# Patient Record
Sex: MALE | Race: WHITE | NOT HISPANIC OR LATINO | Employment: OTHER | URBAN - METROPOLITAN AREA
[De-identification: names, ages, dates, MRNs, and addresses within clinical notes are randomized per-mention and may not be internally consistent; named-entity substitution may affect disease eponyms.]

---

## 2020-03-03 ENCOUNTER — APPOINTMENT (OUTPATIENT)
Dept: PREADMISSION TESTING | Facility: HOSPITAL | Age: 80
End: 2020-03-03

## 2020-03-03 ENCOUNTER — HOSPITAL ENCOUNTER (OUTPATIENT)
Dept: GENERAL RADIOLOGY | Facility: HOSPITAL | Age: 80
Discharge: HOME | End: 2020-03-03
Admitting: ORTHOPAEDIC SURGERY

## 2020-03-03 VITALS — BODY MASS INDEX: 25.47 KG/M2 | WEIGHT: 188.05 LBS | HEIGHT: 72 IN

## 2020-03-03 LAB
25(OH)D3 SERPL-MCNC: 37.1 NG/ML (ref 30–100)
ALBUMIN SERPL-MCNC: 4.6 G/DL (ref 3.5–5.2)
ALBUMIN/GLOB SERPL: 1.8 G/DL
ALP SERPL-CCNC: 86 U/L (ref 39–117)
ALT SERPL W P-5'-P-CCNC: 17 U/L (ref 1–41)
ANION GAP SERPL CALCULATED.3IONS-SCNC: 12 MMOL/L (ref 5–15)
APTT PPP: 28.1 SECONDS (ref 24–37)
AST SERPL-CCNC: 23 U/L (ref 1–40)
BASOPHILS # BLD AUTO: 0.06 10*3/MM3 (ref 0–0.2)
BASOPHILS NFR BLD AUTO: 0.7 % (ref 0–1.5)
BILIRUB SERPL-MCNC: 0.6 MG/DL (ref 0.2–1.2)
BUN BLD-MCNC: 14 MG/DL (ref 8–23)
BUN/CREAT SERPL: 20.3 (ref 7–25)
CALCIUM SPEC-SCNC: 9.5 MG/DL (ref 8.6–10.5)
CHLORIDE SERPL-SCNC: 102 MMOL/L (ref 98–107)
CO2 SERPL-SCNC: 26 MMOL/L (ref 22–29)
CREAT BLD-MCNC: 0.69 MG/DL (ref 0.76–1.27)
DEPRECATED RDW RBC AUTO: 42.5 FL (ref 37–54)
EOSINOPHIL # BLD AUTO: 0.08 10*3/MM3 (ref 0–0.4)
EOSINOPHIL NFR BLD AUTO: 0.9 % (ref 0.3–6.2)
ERYTHROCYTE [DISTWIDTH] IN BLOOD BY AUTOMATED COUNT: 12.3 % (ref 12.3–15.4)
GFR SERPL CREATININE-BSD FRML MDRD: 110 ML/MIN/1.73
GLOBULIN UR ELPH-MCNC: 2.6 GM/DL
GLUCOSE BLD-MCNC: 95 MG/DL (ref 65–99)
HBA1C MFR BLD: 5.5 % (ref 4.8–5.6)
HCT VFR BLD AUTO: 44 % (ref 37.5–51)
HGB BLD-MCNC: 14.5 G/DL (ref 13–17.7)
IMM GRANULOCYTES # BLD AUTO: 0.03 10*3/MM3 (ref 0–0.05)
IMM GRANULOCYTES NFR BLD AUTO: 0.3 % (ref 0–0.5)
INR PPP: 0.99 (ref 0.85–1.16)
LYMPHOCYTES # BLD AUTO: 1.84 10*3/MM3 (ref 0.7–3.1)
LYMPHOCYTES NFR BLD AUTO: 20 % (ref 19.6–45.3)
MCH RBC QN AUTO: 31 PG (ref 26.6–33)
MCHC RBC AUTO-ENTMCNC: 33 G/DL (ref 31.5–35.7)
MCV RBC AUTO: 94.2 FL (ref 79–97)
MONOCYTES # BLD AUTO: 0.97 10*3/MM3 (ref 0.1–0.9)
MONOCYTES NFR BLD AUTO: 10.6 % (ref 5–12)
NEUTROPHILS # BLD AUTO: 6.21 10*3/MM3 (ref 1.7–7)
NEUTROPHILS NFR BLD AUTO: 67.5 % (ref 42.7–76)
NRBC BLD AUTO-RTO: 0 /100 WBC (ref 0–0.2)
PLATELET # BLD AUTO: 268 10*3/MM3 (ref 140–450)
PMV BLD AUTO: 9.9 FL (ref 6–12)
POTASSIUM BLD-SCNC: 3.9 MMOL/L (ref 3.5–5.2)
PROT SERPL-MCNC: 7.2 G/DL (ref 6–8.5)
PROTHROMBIN TIME: 12.8 SECONDS (ref 11.5–14)
RBC # BLD AUTO: 4.67 10*6/MM3 (ref 4.14–5.8)
SODIUM BLD-SCNC: 140 MMOL/L (ref 136–145)
WBC NRBC COR # BLD: 9.19 10*3/MM3 (ref 3.4–10.8)

## 2020-03-03 PROCEDURE — 87081 CULTURE SCREEN ONLY: CPT | Performed by: ORTHOPAEDIC SURGERY

## 2020-03-03 PROCEDURE — 80053 COMPREHEN METABOLIC PANEL: CPT | Performed by: ORTHOPAEDIC SURGERY

## 2020-03-03 PROCEDURE — 85730 THROMBOPLASTIN TIME PARTIAL: CPT | Performed by: ORTHOPAEDIC SURGERY

## 2020-03-03 PROCEDURE — 85025 COMPLETE CBC W/AUTO DIFF WBC: CPT | Performed by: ORTHOPAEDIC SURGERY

## 2020-03-03 PROCEDURE — 36415 COLL VENOUS BLD VENIPUNCTURE: CPT

## 2020-03-03 PROCEDURE — 82306 VITAMIN D 25 HYDROXY: CPT | Performed by: ORTHOPAEDIC SURGERY

## 2020-03-03 PROCEDURE — 71046 X-RAY EXAM CHEST 2 VIEWS: CPT

## 2020-03-03 PROCEDURE — 85610 PROTHROMBIN TIME: CPT | Performed by: ORTHOPAEDIC SURGERY

## 2020-03-03 PROCEDURE — G0480 DRUG TEST DEF 1-7 CLASSES: HCPCS | Performed by: ORTHOPAEDIC SURGERY

## 2020-03-03 PROCEDURE — 83036 HEMOGLOBIN GLYCOSYLATED A1C: CPT | Performed by: ORTHOPAEDIC SURGERY

## 2020-03-03 RX ORDER — CLOPIDOGREL BISULFATE 75 MG/1
75 TABLET ORAL DAILY
Status: ON HOLD | COMMUNITY
End: 2020-03-12 | Stop reason: SDUPTHER

## 2020-03-03 RX ORDER — METOPROLOL SUCCINATE 100 MG/1
100 TABLET, EXTENDED RELEASE ORAL DAILY
COMMUNITY

## 2020-03-03 RX ORDER — LISINOPRIL 40 MG/1
40 TABLET ORAL DAILY
COMMUNITY

## 2020-03-03 RX ORDER — ROSUVASTATIN CALCIUM 10 MG/1
10 TABLET, COATED ORAL DAILY
COMMUNITY

## 2020-03-03 RX ORDER — ASPIRIN 81 MG/1
81 TABLET ORAL DAILY
COMMUNITY

## 2020-03-03 RX ORDER — DILTIAZEM HYDROCHLORIDE 180 MG/1
180 CAPSULE, COATED, EXTENDED RELEASE ORAL DAILY
COMMUNITY

## 2020-03-03 RX ORDER — LEVOTHYROXINE SODIUM 0.07 MG/1
75 TABLET ORAL DAILY
COMMUNITY

## 2020-03-03 ASSESSMENT — HOOS JR
HOOS JR SCORE: 55.985
HOOS JR SCORE: 11

## 2020-03-03 NOTE — PAT
Patient to apply Chlorhexadine wipes  to surgical area (as instructed) the night before procedure and the AM of procedure. Wipes provided.  Patient instructed to drink 20 ounces (or until full) of Gatorade and it needs to be completed 1 hour before given arrival time for procedure (NO RED Gatorade)    Patient verbalized understanding.  Patient passed memory screen with no issues-documents on chart.  Patient and spouse attended joint replacement class on 3-3-20.   EKG, last PPM interrogation notes,  and clearance on chart from Jan 27 2020 from Dr. Jimenez.  Will hold chart and follow up with Dr. Jc and Dr. Jimenez re: stopping Plavix and ASA.

## 2020-03-05 LAB — MRSA SPEC QL CULT: NORMAL

## 2020-03-08 LAB
COTININE UR-MCNC: NORMAL NG/ML
NICOTINE SERPL-MCNC: NORMAL NG/ML

## 2020-03-10 ENCOUNTER — ANESTHESIA EVENT (OUTPATIENT)
Dept: PERIOP | Facility: HOSPITAL | Age: 80
End: 2020-03-10

## 2020-03-10 RX ORDER — FAMOTIDINE 10 MG/ML
20 INJECTION, SOLUTION INTRAVENOUS ONCE
Status: CANCELLED | OUTPATIENT
Start: 2020-03-10 | End: 2020-03-10

## 2020-03-11 ENCOUNTER — APPOINTMENT (OUTPATIENT)
Dept: GENERAL RADIOLOGY | Facility: HOSPITAL | Age: 80
End: 2020-03-11

## 2020-03-11 ENCOUNTER — ANESTHESIA (OUTPATIENT)
Dept: PERIOP | Facility: HOSPITAL | Age: 80
End: 2020-03-11

## 2020-03-11 ENCOUNTER — HOSPITAL ENCOUNTER (INPATIENT)
Facility: HOSPITAL | Age: 80
LOS: 1 days | Discharge: HOME | End: 2020-03-12
Attending: ORTHOPAEDIC SURGERY | Admitting: ORTHOPAEDIC SURGERY

## 2020-03-11 DIAGNOSIS — Z96.642 STATUS POST TOTAL REPLACEMENT OF LEFT HIP: Primary | ICD-10-CM

## 2020-03-11 DIAGNOSIS — Z74.09 IMPAIRED FUNCTIONAL MOBILITY, BALANCE, GAIT, AND ENDURANCE: ICD-10-CM

## 2020-03-11 DIAGNOSIS — M16.12 ARTHRITIS OF LEFT HIP: ICD-10-CM

## 2020-03-11 DIAGNOSIS — Z78.9 IMPAIRED MOBILITY AND ADLS: ICD-10-CM

## 2020-03-11 DIAGNOSIS — Z74.09 IMPAIRED MOBILITY AND ADLS: ICD-10-CM

## 2020-03-11 PROBLEM — I10 HYPERTENSION: Status: ACTIVE | Noted: 2020-03-11

## 2020-03-11 PROBLEM — E03.9 HYPOTHYROID: Status: ACTIVE | Noted: 2020-03-11

## 2020-03-11 PROBLEM — E78.5 HYPERLIPIDEMIA: Status: ACTIVE | Noted: 2020-03-11

## 2020-03-11 PROCEDURE — C1776 JOINT DEVICE (IMPLANTABLE): HCPCS | Performed by: ORTHOPAEDIC SURGERY

## 2020-03-11 PROCEDURE — C1713 ANCHOR/SCREW BN/BN,TIS/BN: HCPCS | Performed by: ORTHOPAEDIC SURGERY

## 2020-03-11 PROCEDURE — 25010000003 EPINEPHRINE 30 MG/30ML SOLUTION: Performed by: ORTHOPAEDIC SURGERY

## 2020-03-11 PROCEDURE — 25010000003 CEFAZOLIN IN DEXTROSE 2-4 GM/100ML-% SOLUTION: Performed by: ORTHOPAEDIC SURGERY

## 2020-03-11 PROCEDURE — 25010000002 KETOROLAC TROMETHAMINE PER 15 MG: Performed by: ORTHOPAEDIC SURGERY

## 2020-03-11 PROCEDURE — 25010000002 ONDANSETRON PER 1 MG: Performed by: NURSE ANESTHETIST, CERTIFIED REGISTERED

## 2020-03-11 PROCEDURE — 25010000002 CLONIDINE PER 1 MG: Performed by: ORTHOPAEDIC SURGERY

## 2020-03-11 PROCEDURE — 25010000002 PROPOFOL 10 MG/ML EMULSION: Performed by: NURSE ANESTHETIST, CERTIFIED REGISTERED

## 2020-03-11 PROCEDURE — 76000 FLUOROSCOPY <1 HR PHYS/QHP: CPT

## 2020-03-11 PROCEDURE — 25010000002 DEXAMETHASONE PER 1 MG: Performed by: NURSE ANESTHETIST, CERTIFIED REGISTERED

## 2020-03-11 PROCEDURE — 0SRB0J9 REPLACEMENT OF LEFT HIP JOINT WITH SYNTHETIC SUBSTITUTE, CEMENTED, OPEN APPROACH: ICD-10-PCS | Performed by: ORTHOPAEDIC SURGERY

## 2020-03-11 PROCEDURE — 73502 X-RAY EXAM HIP UNI 2-3 VIEWS: CPT

## 2020-03-11 PROCEDURE — 97116 GAIT TRAINING THERAPY: CPT

## 2020-03-11 PROCEDURE — 97162 PT EVAL MOD COMPLEX 30 MIN: CPT

## 2020-03-11 DEVICE — IMPLANTABLE DEVICE: Type: IMPLANTABLE DEVICE | Site: HIP | Status: FUNCTIONAL

## 2020-03-11 DEVICE — SUT NONABS MAXBRAID/PE NMBR2 HC5 38IN WHT 900333: Type: IMPLANTABLE DEVICE | Site: HIP | Status: FUNCTIONAL

## 2020-03-11 DEVICE — TOTAL HIP PRIMARY: Type: IMPLANTABLE DEVICE | Site: HIP | Status: FUNCTIONAL

## 2020-03-11 DEVICE — CAP HIP TM UPCHRG: Type: IMPLANTABLE DEVICE | Site: HIP | Status: FUNCTIONAL

## 2020-03-11 DEVICE — CAP CERAM HD HIP UPCHRG: Type: IMPLANTABLE DEVICE | Site: HIP | Status: FUNCTIONAL

## 2020-03-11 DEVICE — HD FEM/HIP BIOLOX/DELTA CERAM NK 28MM STD: Type: IMPLANTABLE DEVICE | Site: HIP | Status: FUNCTIONAL

## 2020-03-11 DEVICE — CMT BONE SIMPLEX/P TMYCIN FDOS 10PK: Type: IMPLANTABLE DEVICE | Site: HIP | Status: FUNCTIONAL

## 2020-03-11 DEVICE — BONE PREPARATION KIT
Type: IMPLANTABLE DEVICE | Site: HIP | Status: FUNCTIONAL
Brand: BIOPREP

## 2020-03-11 DEVICE — LINER G7 2MOBL SZG 46MM: Type: IMPLANTABLE DEVICE | Site: HIP | Status: FUNCTIONAL

## 2020-03-11 DEVICE — SHLL ACET OSSEOTI G7 4H SZG 60MM: Type: IMPLANTABLE DEVICE | Site: HIP | Status: FUNCTIONAL

## 2020-03-11 DEVICE — SCRW ACET CORT TRILOGY S/TAP 6.5X25: Type: IMPLANTABLE DEVICE | Site: HIP | Status: FUNCTIONAL

## 2020-03-11 DEVICE — CAP HIP 2 MOBL UPCHRG: Type: IMPLANTABLE DEVICE | Site: HIP | Status: FUNCTIONAL

## 2020-03-11 RX ORDER — LEVOTHYROXINE SODIUM 0.07 MG/1
75 TABLET ORAL DAILY
Status: DISCONTINUED | OUTPATIENT
Start: 2020-03-12 | End: 2020-03-12 | Stop reason: HOSPADM

## 2020-03-11 RX ORDER — PREGABALIN 75 MG/1
75 CAPSULE ORAL ONCE
Status: COMPLETED | OUTPATIENT
Start: 2020-03-11 | End: 2020-03-11

## 2020-03-11 RX ORDER — ROSUVASTATIN CALCIUM 10 MG/1
10 TABLET, COATED ORAL DAILY
Status: DISCONTINUED | OUTPATIENT
Start: 2020-03-12 | End: 2020-03-12 | Stop reason: HOSPADM

## 2020-03-11 RX ORDER — TRAMADOL HYDROCHLORIDE 50 MG/1
50 TABLET ORAL EVERY 6 HOURS PRN
Status: DISCONTINUED | OUTPATIENT
Start: 2020-03-11 | End: 2020-03-12 | Stop reason: HOSPADM

## 2020-03-11 RX ORDER — BUPIVACAINE HYDROCHLORIDE 5 MG/ML
INJECTION, SOLUTION PERINEURAL
Status: COMPLETED | OUTPATIENT
Start: 2020-03-11 | End: 2020-03-11

## 2020-03-11 RX ORDER — MAGNESIUM HYDROXIDE 1200 MG/15ML
LIQUID ORAL AS NEEDED
Status: DISCONTINUED | OUTPATIENT
Start: 2020-03-11 | End: 2020-03-11 | Stop reason: HOSPADM

## 2020-03-11 RX ORDER — HYDROMORPHONE HYDROCHLORIDE 1 MG/ML
0.5 INJECTION, SOLUTION INTRAMUSCULAR; INTRAVENOUS; SUBCUTANEOUS
Status: DISCONTINUED | OUTPATIENT
Start: 2020-03-11 | End: 2020-03-12 | Stop reason: HOSPADM

## 2020-03-11 RX ORDER — ASPIRIN 81 MG/1
81 TABLET ORAL EVERY 12 HOURS SCHEDULED
Status: DISCONTINUED | OUTPATIENT
Start: 2020-03-12 | End: 2020-03-12 | Stop reason: HOSPADM

## 2020-03-11 RX ORDER — NALOXONE HCL 0.4 MG/ML
0.1 VIAL (ML) INJECTION
Status: DISCONTINUED | OUTPATIENT
Start: 2020-03-11 | End: 2020-03-12 | Stop reason: HOSPADM

## 2020-03-11 RX ORDER — SODIUM CHLORIDE, SODIUM LACTATE, POTASSIUM CHLORIDE, CALCIUM CHLORIDE 600; 310; 30; 20 MG/100ML; MG/100ML; MG/100ML; MG/100ML
9 INJECTION, SOLUTION INTRAVENOUS CONTINUOUS
Status: DISCONTINUED | OUTPATIENT
Start: 2020-03-11 | End: 2020-03-12 | Stop reason: HOSPADM

## 2020-03-11 RX ORDER — DILTIAZEM HYDROCHLORIDE 180 MG/1
180 CAPSULE, COATED, EXTENDED RELEASE ORAL DAILY
Status: DISCONTINUED | OUTPATIENT
Start: 2020-03-12 | End: 2020-03-12 | Stop reason: HOSPADM

## 2020-03-11 RX ORDER — ONDANSETRON 2 MG/ML
4 INJECTION INTRAMUSCULAR; INTRAVENOUS EVERY 6 HOURS PRN
Status: DISCONTINUED | OUTPATIENT
Start: 2020-03-11 | End: 2020-03-12 | Stop reason: HOSPADM

## 2020-03-11 RX ORDER — FENTANYL CITRATE 50 UG/ML
50 INJECTION, SOLUTION INTRAMUSCULAR; INTRAVENOUS
Status: DISCONTINUED | OUTPATIENT
Start: 2020-03-11 | End: 2020-03-11 | Stop reason: HOSPADM

## 2020-03-11 RX ORDER — OXYCODONE HYDROCHLORIDE 5 MG/1
10 TABLET ORAL EVERY 4 HOURS PRN
Status: DISCONTINUED | OUTPATIENT
Start: 2020-03-11 | End: 2020-03-12 | Stop reason: HOSPADM

## 2020-03-11 RX ORDER — HYDROMORPHONE HYDROCHLORIDE 1 MG/ML
0.5 INJECTION, SOLUTION INTRAMUSCULAR; INTRAVENOUS; SUBCUTANEOUS
Status: DISCONTINUED | OUTPATIENT
Start: 2020-03-11 | End: 2020-03-11 | Stop reason: HOSPADM

## 2020-03-11 RX ORDER — KETOROLAC TROMETHAMINE 15 MG/ML
15 INJECTION, SOLUTION INTRAMUSCULAR; INTRAVENOUS EVERY 6 HOURS PRN
Status: DISCONTINUED | OUTPATIENT
Start: 2020-03-11 | End: 2020-03-12 | Stop reason: HOSPADM

## 2020-03-11 RX ORDER — ONDANSETRON 2 MG/ML
INJECTION INTRAMUSCULAR; INTRAVENOUS AS NEEDED
Status: DISCONTINUED | OUTPATIENT
Start: 2020-03-11 | End: 2020-03-11 | Stop reason: SURG

## 2020-03-11 RX ORDER — L.ACID,PARA/B.BIFIDUM/S.THERM 8B CELL
1 CAPSULE ORAL DAILY
Status: DISCONTINUED | OUTPATIENT
Start: 2020-03-12 | End: 2020-03-12 | Stop reason: HOSPADM

## 2020-03-11 RX ORDER — BISACODYL 5 MG/1
10 TABLET, DELAYED RELEASE ORAL DAILY PRN
Status: DISCONTINUED | OUTPATIENT
Start: 2020-03-11 | End: 2020-03-12 | Stop reason: HOSPADM

## 2020-03-11 RX ORDER — SODIUM CHLORIDE 0.9 % (FLUSH) 0.9 %
10 SYRINGE (ML) INJECTION EVERY 12 HOURS SCHEDULED
Status: DISCONTINUED | OUTPATIENT
Start: 2020-03-11 | End: 2020-03-11 | Stop reason: HOSPADM

## 2020-03-11 RX ORDER — ONDANSETRON 2 MG/ML
4 INJECTION INTRAMUSCULAR; INTRAVENOUS ONCE AS NEEDED
Status: DISCONTINUED | OUTPATIENT
Start: 2020-03-11 | End: 2020-03-11 | Stop reason: HOSPADM

## 2020-03-11 RX ORDER — MELOXICAM 15 MG/1
15 TABLET ORAL ONCE
Status: COMPLETED | OUTPATIENT
Start: 2020-03-11 | End: 2020-03-11

## 2020-03-11 RX ORDER — ACETAMINOPHEN 160 MG
TABLET,DISINTEGRATING ORAL AS NEEDED
Status: DISCONTINUED | OUTPATIENT
Start: 2020-03-11 | End: 2020-03-11 | Stop reason: HOSPADM

## 2020-03-11 RX ORDER — METOPROLOL SUCCINATE 100 MG/1
100 TABLET, EXTENDED RELEASE ORAL DAILY
Status: DISCONTINUED | OUTPATIENT
Start: 2020-03-12 | End: 2020-03-12 | Stop reason: HOSPADM

## 2020-03-11 RX ORDER — SODIUM CHLORIDE, SODIUM LACTATE, POTASSIUM CHLORIDE, CALCIUM CHLORIDE 600; 310; 30; 20 MG/100ML; MG/100ML; MG/100ML; MG/100ML
100 INJECTION, SOLUTION INTRAVENOUS CONTINUOUS
Status: DISCONTINUED | OUTPATIENT
Start: 2020-03-11 | End: 2020-03-12 | Stop reason: HOSPADM

## 2020-03-11 RX ORDER — DEXAMETHASONE SODIUM PHOSPHATE 4 MG/ML
INJECTION, SOLUTION INTRA-ARTICULAR; INTRALESIONAL; INTRAMUSCULAR; INTRAVENOUS; SOFT TISSUE AS NEEDED
Status: DISCONTINUED | OUTPATIENT
Start: 2020-03-11 | End: 2020-03-11 | Stop reason: SURG

## 2020-03-11 RX ORDER — DOCUSATE SODIUM 100 MG/1
100 CAPSULE, LIQUID FILLED ORAL 2 TIMES DAILY PRN
Status: DISCONTINUED | OUTPATIENT
Start: 2020-03-11 | End: 2020-03-12 | Stop reason: HOSPADM

## 2020-03-11 RX ORDER — LIDOCAINE HYDROCHLORIDE 10 MG/ML
0.5 INJECTION, SOLUTION EPIDURAL; INFILTRATION; INTRACAUDAL; PERINEURAL ONCE AS NEEDED
Status: COMPLETED | OUTPATIENT
Start: 2020-03-11 | End: 2020-03-11

## 2020-03-11 RX ORDER — MELOXICAM 7.5 MG/1
15 TABLET ORAL DAILY
Status: DISCONTINUED | OUTPATIENT
Start: 2020-03-12 | End: 2020-03-12 | Stop reason: HOSPADM

## 2020-03-11 RX ORDER — BISACODYL 10 MG
10 SUPPOSITORY, RECTAL RECTAL DAILY PRN
Status: DISCONTINUED | OUTPATIENT
Start: 2020-03-11 | End: 2020-03-12 | Stop reason: HOSPADM

## 2020-03-11 RX ORDER — CEFAZOLIN SODIUM 2 G/100ML
2 INJECTION, SOLUTION INTRAVENOUS ONCE
Status: COMPLETED | OUTPATIENT
Start: 2020-03-11 | End: 2020-03-11

## 2020-03-11 RX ORDER — ACETAMINOPHEN 500 MG
1000 TABLET ORAL EVERY 8 HOURS
Status: DISCONTINUED | OUTPATIENT
Start: 2020-03-11 | End: 2020-03-12 | Stop reason: HOSPADM

## 2020-03-11 RX ORDER — CEFAZOLIN SODIUM 2 G/100ML
2 INJECTION, SOLUTION INTRAVENOUS EVERY 8 HOURS
Status: COMPLETED | OUTPATIENT
Start: 2020-03-11 | End: 2020-03-12

## 2020-03-11 RX ORDER — SODIUM CHLORIDE 0.9 % (FLUSH) 0.9 %
10 SYRINGE (ML) INJECTION AS NEEDED
Status: DISCONTINUED | OUTPATIENT
Start: 2020-03-11 | End: 2020-03-11 | Stop reason: HOSPADM

## 2020-03-11 RX ORDER — LABETALOL HYDROCHLORIDE 5 MG/ML
10 INJECTION, SOLUTION INTRAVENOUS EVERY 4 HOURS PRN
Status: DISCONTINUED | OUTPATIENT
Start: 2020-03-11 | End: 2020-03-12 | Stop reason: HOSPADM

## 2020-03-11 RX ORDER — OXYCODONE HYDROCHLORIDE 5 MG/1
5 TABLET ORAL EVERY 4 HOURS PRN
Status: DISCONTINUED | OUTPATIENT
Start: 2020-03-11 | End: 2020-03-12 | Stop reason: HOSPADM

## 2020-03-11 RX ORDER — LISINOPRIL 40 MG/1
40 TABLET ORAL DAILY
Status: DISCONTINUED | OUTPATIENT
Start: 2020-03-12 | End: 2020-03-12 | Stop reason: HOSPADM

## 2020-03-11 RX ORDER — FAMOTIDINE 20 MG/1
20 TABLET, FILM COATED ORAL ONCE
Status: COMPLETED | OUTPATIENT
Start: 2020-03-11 | End: 2020-03-11

## 2020-03-11 RX ORDER — ACETAMINOPHEN 500 MG
1000 TABLET ORAL ONCE
Status: COMPLETED | OUTPATIENT
Start: 2020-03-11 | End: 2020-03-11

## 2020-03-11 RX ORDER — AMOXICILLIN 250 MG
2 CAPSULE ORAL 2 TIMES DAILY PRN
Status: DISCONTINUED | OUTPATIENT
Start: 2020-03-11 | End: 2020-03-12 | Stop reason: HOSPADM

## 2020-03-11 RX ADMIN — KETOROLAC TROMETHAMINE 15 MG: 15 INJECTION, SOLUTION INTRAMUSCULAR; INTRAVENOUS at 16:34

## 2020-03-11 RX ADMIN — CEFAZOLIN SODIUM 2 G: 2 INJECTION, SOLUTION INTRAVENOUS at 19:56

## 2020-03-11 RX ADMIN — ONDANSETRON 4 MG: 2 INJECTION INTRAMUSCULAR; INTRAVENOUS at 11:31

## 2020-03-11 RX ADMIN — MELOXICAM 15 MG: 15 TABLET ORAL at 10:52

## 2020-03-11 RX ADMIN — LIDOCAINE HYDROCHLORIDE 0.3 ML: 10 INJECTION, SOLUTION EPIDURAL; INFILTRATION; INTRACAUDAL; PERINEURAL at 10:52

## 2020-03-11 RX ADMIN — ACETAMINOPHEN 1000 MG: 500 TABLET, FILM COATED ORAL at 18:14

## 2020-03-11 RX ADMIN — SODIUM CHLORIDE, POTASSIUM CHLORIDE, SODIUM LACTATE AND CALCIUM CHLORIDE: 600; 310; 30; 20 INJECTION, SOLUTION INTRAVENOUS at 13:22

## 2020-03-11 RX ADMIN — SODIUM CHLORIDE, POTASSIUM CHLORIDE, SODIUM LACTATE AND CALCIUM CHLORIDE 9 ML/HR: 600; 310; 30; 20 INJECTION, SOLUTION INTRAVENOUS at 10:52

## 2020-03-11 RX ADMIN — TRANEXAMIC ACID 1000 MG: 100 INJECTION, SOLUTION INTRAVENOUS at 11:29

## 2020-03-11 RX ADMIN — SODIUM CHLORIDE, POTASSIUM CHLORIDE, SODIUM LACTATE AND CALCIUM CHLORIDE: 600; 310; 30; 20 INJECTION, SOLUTION INTRAVENOUS at 11:18

## 2020-03-11 RX ADMIN — TRANEXAMIC ACID 1000 MG: 100 INJECTION, SOLUTION INTRAVENOUS at 13:09

## 2020-03-11 RX ADMIN — FAMOTIDINE 20 MG: 20 TABLET ORAL at 10:52

## 2020-03-11 RX ADMIN — DEXAMETHASONE SODIUM PHOSPHATE 4 MG: 4 INJECTION, SOLUTION INTRAMUSCULAR; INTRAVENOUS at 11:31

## 2020-03-11 RX ADMIN — ACETAMINOPHEN 1000 MG: 500 TABLET ORAL at 10:52

## 2020-03-11 RX ADMIN — BUPIVACAINE HYDROCHLORIDE 1.2 ML: 5 INJECTION, SOLUTION PERINEURAL at 11:25

## 2020-03-11 RX ADMIN — SODIUM CHLORIDE, POTASSIUM CHLORIDE, SODIUM LACTATE AND CALCIUM CHLORIDE 100 ML/HR: 600; 310; 30; 20 INJECTION, SOLUTION INTRAVENOUS at 15:37

## 2020-03-11 RX ADMIN — PROPOFOL 66 MCG/KG/MIN: 10 INJECTION, EMULSION INTRAVENOUS at 11:25

## 2020-03-11 RX ADMIN — PREGABALIN 75 MG: 75 CAPSULE ORAL at 10:52

## 2020-03-11 RX ADMIN — CEFAZOLIN SODIUM 2 G: 2 INJECTION, SOLUTION INTRAVENOUS at 11:22

## 2020-03-11 RX ADMIN — MUPIROCIN 1 APPLICATION: 20 OINTMENT TOPICAL at 10:52

## 2020-03-11 NOTE — ANESTHESIA POSTPROCEDURE EVALUATION
Patient: Benito Forman    Procedure Summary     Date:  03/11/20 Room / Location:   ANY OR 14 /  ANY OR    Anesthesia Start:  1118 Anesthesia Stop:  1349    Procedure:  TOTAL HIP ARTHROPLASTY ANTERIOR DIRECT CEMENTED LEFT (Left Hip) Diagnosis:       Arthritis of left hip      (Arthritis of left hip [[id number]])    Surgeon:  Vishnu Jc MD Provider:  Ellyn Galindo MD    Anesthesia Type:  spinal ASA Status:  3          Anesthesia Type: spinal    Vitals  No vitals data found for the desired time range.          Post Anesthesia Care and Evaluation    Patient location during evaluation: PACU  Patient participation: complete - patient participated  Level of consciousness: awake and alert  Pain score: 0  Pain management: adequate  Airway patency: patent  Anesthetic complications: No anesthetic complications  PONV Status: none  Cardiovascular status: hemodynamically stable and acceptable  Respiratory status: nonlabored ventilation, acceptable and nasal cannula  Hydration status: acceptable

## 2020-03-11 NOTE — THERAPY EVALUATION
Patient Name: Benito Forman  : 1940    MRN: [id number]                              Today's Date: 3/11/2020       Admit Date: 3/11/2020    Visit Dx: No diagnosis found.  Patient Active Problem List   Diagnosis   • Arthritis of left hip   • Status post total replacement of left hip   • Hypertension   • Hypothyroid   • Hyperlipidemia     Past Medical History:   Diagnosis Date   • Arthritis    • Disease of thyroid gland    • Elevated cholesterol    • Hearing aid worn    • Heart attack (CMS/HCC)    • Hypertension    • Irregular heart beat    • Wears dentures    • Wears glasses      Past Surgical History:   Procedure Laterality Date   • CARDIAC CATHETERIZATION     • CHOLECYSTECTOMY     • COLONOSCOPY     • CORONARY ANGIOPLASTY WITH STENT PLACEMENT     • ELBOW FUSION     • PACEMAKER IMPLANTATION     • POLYPECTOMY       General Information     Row Name 20 1551          PT Evaluation Time/Intention    Document Type  evaluation  -LR     Mode of Treatment  physical therapy;individual therapy  -LR     Row Name 20 1551          General Information    Patient Profile Reviewed?  yes  -LR     Prior Level of Function  min assist:;all household mobility;community mobility;gait;transfer;bed mobility;ADL's;using stairs;shopping;independent:;driving;dependent:;home management;cooking;cleaning no home management, limited long distances, used SPC at all times  -LR     Existing Precautions/Restrictions  fall;left;hip, anterior;other (see comments) [place]  -LR     Barriers to Rehab  previous functional deficit  -LR     Row Name 20 1551          Relationship/Environment    Lives With  spouse wife available to assist at all times upon d/c home  -LR     Row Name 20 1551          Resource/Environmental Concerns    Current Living Arrangements  home/apartment/condo  -LR     Row Name 20 1551          Home Main Entrance    Number of Stairs, Main Entrance  one  -LR     Stair Railings, Main Entrance  none  -LR      Row Name 03/11/20 1551          Stairs Within Home, Primary    Number of Stairs, Within Home, Primary  none  -LR     Row Name 03/11/20 1551          Cognitive Assessment/Intervention- PT/OT    Orientation Status (Cognition)  oriented x 4  -LR     Row Name 03/11/20 1551          Safety Issues, Functional Mobility    Safety Issues Affecting Function (Mobility)  awareness of need for assistance;insight into deficits/self awareness;positioning of assistive device;sequencing abilities;safety precautions follow-through/compliance;safety precaution awareness  -LR     Impairments Affecting Function (Mobility)  pain;strength;range of motion (ROM)  -LR       User Key  (r) = Recorded By, (t) = Taken By, (c) = Cosigned By    Initials Name Provider Type    LR Saloni Marc, PT Physical Therapist        Mobility     Row Name 03/11/20 1551          Bed Mobility Assessment/Treatment    Bed Mobility Assessment/Treatment  supine-sit  -LR     Supine-Sit [place] (Bed Mobility)  verbal cues;contact guard  -LR     Assistive Device (Bed Mobility)  head of bed elevated;bed rails  -LR     Comment (Bed Mobility)  Verbal cues to move LEs towards EOB and to push up from bed to raise trunk into sitting and to scoot hips out to get feet on floor. CGA to L LE only. Denied dizziness upon sitting up.   -LR     Row Name 03/11/20 1551          Transfer Assessment/Treatment    Comment (Transfers)  Verbal cues to push up from bed to stand and to reach back for chair to lower into sitting. Verbal cues to step L LE out before t/f for comfort. Assisted to and from bathroom. Cues to drive RW over commode to stand to void.   -LR     Row Name 03/11/20 1551          Sit-Stand Transfer    Sit-Stand [place] (Transfers)  verbal cues;contact guard;2 person assist  -LR     Assistive Device (Sit-Stand Transfers)  walker, front-wheeled  -LR     Row Name 03/11/20 1551          Gait/Stairs Assessment/Training    31551 - Gait Training Minutes   6   -LR     [place] Level (Gait)  verbal cues;contact guard;2 person assist  -LR     Assistive Device (Gait)  walker, front-wheeled  -LR     Distance in Feet (Gait)  250  -LR     Pattern (Gait)  step-through  -LR     Deviations/Abnormal Patterns (Gait)  bilateral deviations;bethany decreased;gait speed decreased;stride length decreased;left sided deviations;antalgic  -LR     Bilateral Gait Deviations  forward flexed posture;heel strike decreased  -LR     Left Sided Gait Deviations  weight shift ability decreased  -LR     [place] Level (Stairs)  verbal cues;contact guard;2 person assist  -LR     Assistive Device (Stairs)  walker, front-wheeled  -LR     Comment (Gait/Stairs)  Patient ambulated with step through gait pattern at slow pace. Verbal cues for increased L LE weight bearing/stance phase, decreased UE weight bearing, and increased step length. Improved with cues for correction. Cues to not pivot or twist during turns. Gait limited by pain and fatigue.   -LR     Row Name 03/11/20 1551          Mobility Assessment/Intervention    Extremity Weight-bearing Status  left lower extremity  -LR     Left Lower Extremity (Weight-bearing Status)  weight-bearing as tolerated (WBAT)  -LR       User Key  (r) = Recorded By, (t) = Taken By, (c) = Cosigned By    Initials Name Provider Type    LR Saloni Marc, PT Physical Therapist        Obj/Interventions     Row Name 03/11/20 1551          General ROM    GENERAL ROM COMMENTS  R LE AROM WFL; L hip AROM impaired 25%  -LR     Row Name 03/11/20 1551          MMT (Manual Muscle Testing)    General MMT Comments  R LE WFL; L hip functionally 4-/5, independent with SLR, no knee buckling with gait  -LR     Row Name 03/11/20 1551          Therapeutic Exercise    Lower Extremity (Therapeutic Exercise)  gluteal sets;quad sets, left  -LR     Lower Extremity Range of Motion (Therapeutic Exercise)  ankle dorsiflexion/plantar flexion, left  -LR     Exercise Type (Therapeutic  Exercise)  AROM (active range of motion);isotonic contraction, concentric;isometric contraction, static  -LR     Position (Therapeutic Exercise)  supine  -LR     Sets/Reps (Therapeutic Exercise)  x10 reps each  -LR     Comment (Therapeutic Exercise)  cues for technique; able to actively DF bilaterally  -LR     Row Name 03/11/20 1551          Sensory Assessment/Intervention    Sensory General Assessment  no sensation deficits identified denies numbness/tingling; light touch equal and intact  -LR       User Key  (r) = Recorded By, (t) = Taken By, (c) = Cosigned By    Initials Name Provider Type    LR Saloni Marc, PT Physical Therapist        Goals/Plan     Row Name 03/11/20 1551          Bed Mobility Goal 1 (PT)    Activity/Assistive Device (Bed Mobility Goal 1, PT)  sit to supine/supine to sit  -LR     [place] Level/Cues Needed (Bed Mobility Goal 1, PT)  conditional independence  -LR     Time Frame (Bed Mobility Goal 1, PT)  long term goal (LTG);3 days  -LR     Progress/Outcomes (Bed Mobility Goal 1, PT)  goal ongoing  -LR     Row Name 03/11/20 1551          Transfer Goal 1 (PT)    Activity/Assistive Device (Transfer Goal 1, PT)  sit-to-stand/stand-to-sit;walker, rolling  -LR     [place] Level/Cues Needed (Transfer Goal 1, PT)  conditional independence  -LR     Time Frame (Transfer Goal 1, PT)  long term goal (LTG);3 days  -LR     Progress/Outcome (Transfer Goal 1, PT)  goal ongoing  -LR     Row Name 03/11/20 1551          Gait Training Goal 1 (PT)    Activity/Assistive Device (Gait Training Goal 1, PT)  gait (walking locomotion);walker, rolling  -LR     [place] Level (Gait Training Goal 1, PT)  conditional independence  -LR     Distance (Gait Goal 1, PT)  500 feet  -LR     Time Frame (Gait Training Goal 1, PT)  long term goal (LTG);3 days  -LR     Progress/Outcome (Gait Training Goal 1, PT)  goal ongoing  -LR     Row Name 03/11/20 1551          Stairs Goal 1 (PT)    Activity/Assistive  Device (Stairs Goal 1, PT)  ascending stairs;descending stairs;step-to-step;walker, rolling  -LR     [place] Level/Cues Needed (Stairs Goal 1, PT)  contact guard assist  -LR     Number of Stairs (Stairs Goal 1, PT)  1  -LR     Time Frame (Stairs Goal 1, PT)  long term goal (LTG);3 days  -LR     Progress/Outcome (Stairs Goal 1, PT)  goal ongoing  -LR       User Key  (r) = Recorded By, (t) = Taken By, (c) = Cosigned By    Initials Name Provider Type    LR Saloni Marc, PT Physical Therapist        Clinical Impression     Row Name 03/11/20 [email or web address]          Pain Assessment    Additional Documentation  Pain Scale: Numbers Pre/Post-Treatment (Group)  -LR     Row Name 03/11/20 [facility]          Pain Scale: Numbers Pre/Post-Treatment    Pain Scale: Numbers, Pretreatment  0/10 - no pain  -LR     Pain Scale: Numbers, Post-Treatment  5/10  -LR     Pain Location - Side  Left  -LR     Pain Location - Orientation  anterior  -LR     Pain Location  hip  -LR     Pain Intervention(s)  Ambulation/increased activity;Repositioned;Medication (See MAR);Cold applied  -LR     Row Name 03/11/20 1551          Plan of Care Review    Plan of Care Reviewed With  patient;spouse;son  -LR     Progress  improving  -LR     Row Name 03/11/20 [facility]          Physical Therapy Clinical Impression    Patient/Family Goals Statement (PT Clinical Impression)  go home, decrease pain  -LR     Criteria for Skilled Interventions Met (PT Clinical Impression)  yes;treatment indicated  -LR     Rehab Potential (PT Clinical Summary)  good, to achieve stated therapy goals  -LR     Row Name 03/11/20 1551          Positioning and Restraints    Pre-Treatment Position  in bed  -LR     Post Treatment Position  chair  -LR     In Chair  notified nsg;reclined;sitting;call light within reach;encouraged to call for assist;exit alarm on;with family/caregiver;legs elevated;compression device  -LR       User Key  (r) = Recorded By, (t) = Taken By, (c) = Cosigned By     Initials Name Provider Type    LR Saloni Marc, PT Physical Therapist        Outcome Measures     Row Name 03/11/20 1551          How much help from another person do you currently need...    Turning from your back to your side while in flat bed without using bedrails?  3  -LR     Moving from lying on back to sitting on the side of a flat bed without bedrails?  3  -LR     Moving to and from a bed to a chair (including a wheelchair)?  3  -LR     Standing up from a chair using your arms (e.g., wheelchair, bedside chair)?  3  -LR     Climbing 3-5 steps with a railing?  3  -LR     To walk in hospital room?  3  -LR     AM-PAC 6 Clicks Score (PT)  18  -LR     Row Name 03/11/20 1551          Functional Assessment    Outcome Measure Options  AM-PAC 6 Clicks Basic Mobility (PT)  -LR       User Key  (r) = Recorded By, (t) = Taken By, (c) = Cosigned By    Initials Name Provider Type    Saloni Gaxiola, PT Physical Therapist        Physical Therapy Education                 Title: PT OT SLP Therapies (Done)     Topic: Physical Therapy (Done)     Point: Mobility training (Done)     Description:   Instruct learner(s) on safety and technique for assisting patient out of bed, chair or wheelchair.  Instruct in the proper use of assistive devices, such as walker, crutches, cane or brace.              Patient Friendly Description:   It's important to get you on your feet again, but we need to do so in a way that is safe for you. Falling has serious consequences, and your personal safety is the most important thing of all.        When it's time to get out of bed, one of us or a family member will sit next to you on the bed to give you support.     If your doctor or nurse tells you to use a walker, crutches, a cane, or a brace, be sure you use it every time you get out of bed, even if you think you don't need it.    Learning Progress Summary           Patient Acceptance, E,D, VU,NR by LR at 3/11/2020 1341    Comment:   Educated on anterior hip precautions, weight bearing status, correct supine to sit t/f technique, correct sit<->stand t/f technique, correct gait mechanics, and progression of POC.   Family Acceptance, E,D, VU,NR by LR at 3/11/2020 1551    Comment:  Educated on anterior hip precautions, weight bearing status, correct supine to sit t/f technique, correct sit<->stand t/f technique, correct gait mechanics, and progression of POC.   Significant Other Acceptance, E,D, VU,NR by LR at 3/11/2020 1551    Comment:  Educated on anterior hip precautions, weight bearing status, correct supine to sit t/f technique, correct sit<->stand t/f technique, correct gait mechanics, and progression of POC.                   Point: Home exercise program (Done)     Description:   Instruct learner(s) on appropriate technique for monitoring, assisting and/or progressing patient with therapeutic exercises and activities.              Learning Progress Summary           Patient Acceptance, E,D, VU,NR by LR at 3/11/2020 1551    Comment:  Educated on anterior hip precautions, weight bearing status, correct supine to sit t/f technique, correct sit<->stand t/f technique, correct gait mechanics, and progression of POC.   Family Acceptance, E,D, VU,NR by LR at 3/11/2020 1551    Comment:  Educated on anterior hip precautions, weight bearing status, correct supine to sit t/f technique, correct sit<->stand t/f technique, correct gait mechanics, and progression of POC.   Significant Other Acceptance, E,D, VU,NR by LR at 3/11/2020 1551    Comment:  Educated on anterior hip precautions, weight bearing status, correct supine to sit t/f technique, correct sit<->stand t/f technique, correct gait mechanics, and progression of POC.                   Point: Body mechanics (Done)     Description:   Instruct learner(s) on proper positioning and spine alignment for patient and/or caregiver during mobility tasks and/or exercises.              Learning Progress  Summary           Patient Acceptance, E,D, VU,NR by LR at 3/11/2020 1551    Comment:  Educated on anterior hip precautions, weight bearing status, correct supine to sit t/f technique, correct sit<->stand t/f technique, correct gait mechanics, and progression of POC.   Family Acceptance, E,D, VU,NR by LR at 3/11/2020 1551    Comment:  Educated on anterior hip precautions, weight bearing status, correct supine to sit t/f technique, correct sit<->stand t/f technique, correct gait mechanics, and progression of POC.   Significant Other Acceptance, E,D, VU,NR by LR at 3/11/2020 1551    Comment:  Educated on anterior hip precautions, weight bearing status, correct supine to sit t/f technique, correct sit<->stand t/f technique, correct gait mechanics, and progression of POC.                   Point: Precautions (Done)     Description:   Instruct learner(s) on prescribed precautions during mobility and gait tasks              Learning Progress Summary           Patient Acceptance, E,D, VU,NR by LR at 3/11/2020 1551    Comment:  Educated on anterior hip precautions, weight bearing status, correct supine to sit t/f technique, correct sit<->stand t/f technique, correct gait mechanics, and progression of POC.   Family Acceptance, E,D, VU,NR by LR at 3/11/2020 1551    Comment:  Educated on anterior hip precautions, weight bearing status, correct supine to sit t/f technique, correct sit<->stand t/f technique, correct gait mechanics, and progression of POC.   Significant Other Acceptance, E,D, VU,NR by LR at 3/11/2020 1551    Comment:  Educated on anterior hip precautions, weight bearing status, correct supine to sit t/f technique, correct sit<->stand t/f technique, correct gait mechanics, and progression of POC.                               User Key     Initials Effective Dates Name Provider Type Discipline    LR 06/19/15 -  Saloni Marc, PT Physical Therapist PT              PT Recommendation and Plan  Planned Therapy  Interventions (PT Eval): balance training, bed mobility training, gait training, home exercise program, stair training, ROM (range of motion), patient/family education, strengthening, transfer training  Outcome Summary/Treatment Plan (PT)  Anticipated Equipment Needs at Discharge (PT): front wheeled walker  Anticipated Discharge Disposition (PT): home with assist, home with home health  Plan of Care Reviewed With: patient, spouse, son  Progress: improving  Outcome Summary: Patient ambulated 250 feet with RW and step through gait pattern, limited by pain and fatigue. Plan is d/c home with family and HHPT. Encouraged patient to ambulate with nursing again later tonight.Will continue to progress as able. Recommend OT consult for ADL and adaptive equipment training. PADD score of 8.     Time Calculation:   PT Charges     Row Name 03/11/20 1551             Time Calculation    Start Time  1551  -LR      PT Received On  03/11/20  -LR      PT Goal Re-Cert Due Date  03/21/20  -LR         Time Calculation- PT    Total Timed Code Minutes- PT  8 minute(s)  -LR         Timed Charges    47094 - PT Therapeutic Exercise Minutes  2  -LR      70175 - Gait Training Minutes   6  -LR        User Key  (r) = Recorded By, (t) = Taken By, (c) = Cosigned By    Initials Name Provider Type    LR Saloni Marc, PT Physical Therapist        Therapy Charges for Today     Code Description Service Date Service Provider Modifiers Qty    60851942657 HC PT THER SUPP EA 15 MIN 3/11/2020 Saloni Marc, PT GP 2    93193012603 HC GAIT TRAINING EA 15 MIN 3/11/2020 Saloni Marc, PT GP 1    [id number]  PT EVAL MOD COMPLEXITY 3 3/11/2020 Saloni Marc, PT GP 1          PT G-Codes  Outcome Measure Options: AM-PAC 6 Clicks Basic Mobility (PT)  AM-PAC 6 Clicks Score (PT): 18    Saloni Marc, PT  3/11/2020

## 2020-03-11 NOTE — PLAN OF CARE
Problem: Patient Care Overview  Goal: Plan of Care Review  Outcome: Ongoing (interventions implemented as appropriate)  Flowsheets (Taken 3/11/2020 4032)  Outcome Summary: Patient ambulated 250 feet with RW and step through gait pattern, limited by pain and fatigue. Plan is d/c home with family and HHPT. Encouraged patient to ambulate with nursing again later tonight.Will continue to progress as able. Recommend OT consult for ADL and adaptive equipment training. PADD score of 8.

## 2020-03-11 NOTE — ANESTHESIA PROCEDURE NOTES
Spinal Block      Patient reassessed immediately prior to procedure    Patient location during procedure: OR  Indication:at surgeon's request  Performed By  CRNA: Lissett Cardoza CRNA  Preanesthetic Checklist  Completed: patient identified, site marked, surgical consent, pre-op evaluation, timeout performed, IV checked, risks and benefits discussed and monitors and equipment checked  Spinal Block Prep:  Patient Position:sitting  Sterile Tech:cap, gloves, sterile barriers and mask  Prep:Chloraprep  Patient Monitoring:blood pressure monitoring, continuous pulse oximetry and EKG  Spinal Block Procedure  Approach:midline  Guidance:landmark technique and palpation technique  Location:L4-L5  Needle Type:Quincke  Needle Gauge:22 G  Placement of Spinal needle event:cerebrospinal fluid aspirated  Paresthesia: no  Fluid Appearance:clear  Medications: bupivacaine (MARCAINE) 0.5 % injection, 1.2 mL  Med Administered at 3/11/2020 11:25 AM   Post Assessment  Patient Tolerance:patient tolerated the procedure well with no apparent complications  Complications no  Additional Notes  Procedure:  Pt assisted to sitting position, with legs in position of comfort over side of bed.  Pt. instructed in optimal spine presentation, the spine was prepped/ Draped and the skin at insertion site was anesthetized with 1% Lidocaine 2 ml.  The spinal needle was then advanced until CSF flow was obtained and LA was injected:

## 2020-03-11 NOTE — H&P
Patient Name: Benito Forman  MRN: [id number]  : 1940  DOS: 3/11/2020    Attending: Vishnu Jc MD    Primary Care Provider: Andrew Jon      Chief complaint: Left hip pain    Subjective   Patient is a 79 y.o. male presented for left total hip arthroplasty by Dr. Jc.    He underwent surgery under spinal anesthesia.  He tolerated surgery well and is admitted for further medical management. His knee has been painful for many years. He uses a cane for ambulation. He reports a recent fall.    When seen in PACU he is doing well. He denies pain, but is still under effects of spinal block. He denies nausea, shortness of breath or chest pain. No hx of DVT or PE.    He has hx of HTN, HLD and CAD. He is followed by Dr. Jimenez, cardiology, and had preop cardiac clearance.     (Above is noted, agree.  Doing well when I saw him in his room afterwards.  Spinal anesthesia effects have subsided.  He was seen by physical therapy and ambulated 250 feet with a rolling walker and step through gait pattern.  No nausea, vomiting, no shortness of breath.  He has remote cardiac stent with no history of angina or anginal equivalent recently.  He complains of feeling his legs/ankles swelling this afternoon.  He has been drinking plenty of fluids.  Has been on IV fluids following surgery.)wy  Allergies:  No Known Allergies    Meds:  Medications Prior to Admission   Medication Sig Dispense Refill Last Dose   • dilTIAZem CD (CARDIZEM CD) 180 MG 24 hr capsule Take 180 mg by mouth Daily.   3/11/2020 at 0900   • levothyroxine (SYNTHROID, LEVOTHROID) 75 MCG tablet Take 75 mcg by mouth Daily.   3/11/2020 at 0900   • lisinopril (PRINIVIL,ZESTRIL) 40 MG tablet Take 40 mg by mouth Daily.   3/11/2020 at 0900   • metoprolol succinate XL (TOPROL-XL) 100 MG 24 hr tablet Take 100 mg by mouth Daily.   3/11/2020 at 0900   • Probiotic Product (PROBIOTIC-10 PO) Take 1 tablet by mouth Daily.   3/11/2020 at 0900   • rosuvastatin (CRESTOR)  "10 MG tablet Take 10 mg by mouth Daily.   3/11/2020 at 0900   • Ubiquinone (ULTRA COQ10 PO) Take 1 tablet by mouth Daily.   3/10/2020 at Unknown time   • aspirin 81 MG EC tablet Take 81 mg by mouth Daily. Was instructed by Dr. Jc's office to have last dose 2-29-20   3/1/2020   • clopidogrel (PLAVIX) 75 MG tablet Take 75 mg by mouth Daily. Last dose 2-29-20 per Dr. Jc's orders   3/1/2020       History:   Past Medical History:   Diagnosis Date   • Arthritis    •  Hypothyroid    • Elevated cholesterol    • Hearing aid worn    • Heart attack (CMS/HCC)    • Hypertension    • Irregular heart beat    • Wears dentures    • Wears glasses      Past Surgical History:   Procedure Laterality Date   • CARDIAC CATHETERIZATION     • CHOLECYSTECTOMY     • COLONOSCOPY  2015   • CORONARY ANGIOPLASTY WITH STENT PLACEMENT     • ELBOW FUSION     • PACEMAKER IMPLANTATION     • POLYPECTOMY       History reviewed. No pertinent family history.  Social History     Tobacco Use   • Smoking status: Never Smoker   • Smokeless tobacco: Never Used   Substance Use Topics   • Alcohol use: Never     Frequency: Never   • Drug use: Never   He is  with 3 children. He is retired from quality manufacturing.    Review of Systems  Pertinent items are noted in HPI, all other systems reviewed and negative    Vital Signs  Visit Vitals  /89 (Patient Position: Lying)   Pulse 74   Temp 97.8 °F (36.6 °C) (Oral)   Resp 18   Ht 182.9 cm (72\")   Wt 85.3 kg (188 lb)   SpO2 94%   BMI 25.50 kg/m²       Physical Exam:    General Appearance:    Alert, cooperative, in no acute distress   Head:    Normocephalic, without obvious abnormality, atraumatic   Eyes:            Lids and lashes normal, conjunctivae and sclerae normal, no   icterus, no pallor, corneas clear   Ears:    Ears appear intact with no abnormalities noted   Neck:   No adenopathy, supple, trachea midline, no thyromegaly   Lungs:     Clear to auscultation, respirations regular, even and     "               unlabored    Heart:    Regular rhythm and normal rate, normal S1 and S2, no            murmur, no gallop   Abdomen:     Normal bowel sounds, no masses, no organomegaly, soft        non-tender, non-distended, no guarding, no rebound                 tenderness   Genitalia:    Deferred   Extremities:   Left hip Optifoam CDI.   Pulses:   Pulses palpable and equal bilaterally   Skin:   No bleeding, bruising or rash   Neurologic:   Cranial nerves 2 - 12 grossly intact, lack of movement or sensation BLE d/t lingering effects of spinal block  (On follow-up exam intact flexion dorsiflexion bilateral feet.)wy      I reviewed the patient's new clinical results.     Results for NIYA HERNANDEZ (MRN [id number]) as of 3/11/2020 14:00   Ref. Range 3/3/2020 14:32   Glucose Latest Ref Range: 65 - 99 mg/dL 95   Sodium Latest Ref Range: 136 - 145 mmol/L 140   Potassium Latest Ref Range: 3.5 - 5.2 mmol/L 3.9   CO2 Latest Ref Range: 22.0 - 29.0 mmol/L 26.0   Chloride Latest Ref Range: 98 - 107 mmol/L 102   Anion Gap Latest Ref Range: 5.0 - 15.0 mmol/L 12.0   Creatinine Latest Ref Range: 0.76 - 1.27 mg/dL 0.69 (L)   BUN Latest Ref Range: 8 - 23 mg/dL 14   BUN/Creatinine Ratio Latest Ref Range: 7.0 - 25.0  20.3   Calcium Latest Ref Range: 8.6 - 10.5 mg/dL 9.5   eGFR Non African Am Latest Ref Range: >60 mL/min/1.73 110   Alkaline Phosphatase Latest Ref Range: 39 - 117 U/L 86   Total Protein Latest Ref Range: 6.0 - 8.5 g/dL 7.2   ALT (SGPT) Latest Ref Range: 1 - 41 U/L 17   AST (SGOT) Latest Ref Range: 1 - 40 U/L 23   Total Bilirubin Latest Ref Range: 0.2 - 1.2 mg/dL 0.6   Albumin Latest Ref Range: 3.50 - 5.20 g/dL 4.60   Globulin Latest Units: gm/dL 2.6   A/G Ratio Latest Units: g/dL 1.8   Hemoglobin A1C Latest Ref Range: 4.80 - 5.60 % 5.50   25 Hydroxy, Vitamin D Latest Ref Range: 30.0 - 100.0 ng/ml 37.1   Protime Latest Ref Range: 11.5 - 14.0 Seconds 12.8   INR Latest Ref Range: 0.85 - 1.16  0.99   PTT Latest Ref Range:  24.0 - 37.0 seconds 28.1   WBC Latest Ref Range: 3.40 - 10.80 10*3/mm3 9.19   RBC Latest Ref Range: 4.14 - 5.80 10*6/mm3 4.67   Hemoglobin Latest Ref Range: 13.0 - 17.7 g/dL 14.5   Hematocrit Latest Ref Range: 37.5 - 51.0 % 44.0   RDW Latest Ref Range: 12.3 - 15.4 % 12.3   MCV Latest Ref Range: 79.0 - 97.0 fL 94.2   MCH Latest Ref Range: 26.6 - 33.0 pg 31.0   MCHC Latest Ref Range: 31.5 - 35.7 g/dL 33.0   MPV Latest Ref Range: 6.0 - 12.0 fL 9.9   Platelets Latest Ref Range: 140 - 450 10*3/mm3 268     Assessment and Plan:     Status post total replacement of left hip    Arthritis of left hip    Hypertension    Hypothyroid    Hyperlipidemia      Plan  1. PT/OT- early ambulation postop  2. Pain control-prns   3. IS-encourage  4. DVT proph- mechs/ASA.  Resume Plavix when okay with Dr. Jc  (Expect discharging home on combined regimen of aspirin and Plavix for DVT prophylaxis.)wy  5. Bowel regimen  6. Resume home medications as appropriate  7. Monitor post-op labs  8. Discharge planning     HTN, Hyperlipidemia  - Continue home Cardizem, lisinopril, Toprol and statin  - Monitor BP   - Holding parameters for BP meds  - Labetalol PRN for SBP>170    Hypothyroid  - Continue home Synthroid      KRISTYN Pineda  03/11/20  16:58     I have personally performed the evaluation on this patient. My history is consistent  with HPI obtained. My exam findings are listed above. I have personally reviewed and discussed the above formulated treatment plan with pt and . KRISTYN.wy.

## 2020-03-11 NOTE — INTERVAL H&P NOTE
"Pre-Op H&P (See Recent Office Note Attached for Full H&P)    Chief complaint: Left hip pain    HPI:      Patient is a 79 y.o. male who presents with a history of left hip pain. Radiographs taken of the left hip demonstrate severe osteoarthritis. Conservative treatment has failed to provide significant relief. Surgical intervention is recommended and he is agreeable. He is here today for an anterior direct left total hip arthroplasty.    Review of Systems:  General ROS:  no fever, chills, rashes, No change since last office visit  Cardiovascular ROS: no chest pain or dyspnea on exertion; +HTN, +dyslipidemia, +CAD- s/p MI, cath w/stents, pacemaker  Respiratory ROS: no cough, shortness of breath, or wheezing    Meds:    No current facility-administered medications on file prior to encounter.      No current outpatient medications on file prior to encounter.       Vital Signs:  BP (!) 158/104 (BP Location: Right arm, Patient Position: Lying)   Pulse 70   Temp 98 °F (36.7 °C) (Temporal)   Resp 18   Ht 182.9 cm (72\")   Wt 85.3 kg (188 lb)   SpO2 96%   BMI 25.50 kg/m²     Physical Exam:    CV:  S1S2 regular rate and rhythm, no murmur               Resp:  Clear to auscultation; respirations regular, even and unlabored    Results Review:     Lab Results   Component Value Date    WBC 9.19 03/03/2020    HGB 14.5 03/03/2020    HCT 44.0 03/03/2020    MCV 94.2 03/03/2020     03/03/2020    NEUTROABS 6.21 03/03/2020    GLUCOSE 95 03/03/2020    BUN 14 03/03/2020    CREATININE 0.69 (L) 03/03/2020    EGFRIFNONA 110 03/03/2020     03/03/2020    K 3.9 03/03/2020     03/03/2020    CO2 26.0 03/03/2020    CALCIUM 9.5 03/03/2020    ALBUMIN 4.60 03/03/2020    AST 23 03/03/2020    ALT 17 03/03/2020    BILITOT 0.6 03/03/2020        I reviewed the patient's new clinical results.     3/3/20 CXR: reviewed, no active cardiopulmonary disease  1/2720 ECG: reviewed  1/2720 pacemaker interrogated  1/27/20 cardiac clearance " received and considered an acceptable risk for surgery.    Cancer Staging (if applicable)  Cancer Patient: __ yes _X_no __unknown; If yes, clinical stage T:__ N:__M:__, stage group or __N/A    Assessment: Primary osteoarthritis of left hip    Plan:   1. Left anterior direct total hip arthroplasty  2. Anesthesia aware of elevated BP.  Will continue to monitor closely.      Danielle Browne, KRISTYN  3/11/2020   11:01

## 2020-03-11 NOTE — ANESTHESIA PREPROCEDURE EVALUATION
Anesthesia Evaluation                  Airway   Mallampati: II  TM distance: >3 FB  Neck ROM: full  No difficulty expected  Dental - normal exam     Pulmonary - normal exam   Cardiovascular - normal exam    (+) hypertension, past MI , cardiac stents hyperlipidemia,       Neuro/Psych  GI/Hepatic/Renal/Endo      Musculoskeletal     Abdominal  - normal exam    Bowel sounds: normal.   Substance History      OB/GYN          Other   arthritis,                    Anesthesia Plan    ASA 3     spinal     intravenous induction     Anesthetic plan, all risks, benefits, and alternatives have been provided, discussed and informed consent has been obtained with: patient.    Plan discussed with CRNA.

## 2020-03-11 NOTE — OP NOTE
TOTAL HIP ARTHROPLASTY ANTERIOR  Procedure Report    Patient Name:  Benito Forman  YOB: 1940    Date of Surgery:  3/11/2020     Indications:    79 y.o. male who was admitted to [facility] on a progressive management of nonoperative treatment of hip osteoarthritis. Unfortunately patient progressed with significant pain and difficulty with ambulation and daily function.  The patient was indicated for a total hip arthroplasty. Likely risk benefits of the procedure including but not limited to infection, DVT, pulmonary embolism, leg length discrepancy, recurrent dislocation, possibility of injury to nerves and vessels, and periprosthetic fractures have been discussed with the patient. Despite the risks involved the patient elected to proceed with an informed consent was obtained.     Patient Identification:  Patient was seen in the prep, consent was reviewed, operative procedure was identified, surgical site and thigh marked.        Pre-op Diagnosis:   Arthritis of left hip [4103639]       Post-Op Diagnosis Codes:     * Arthritis of left hip [M16.12]    Procedure/CPT® Codes:      Procedure(s):  TOTAL HIP ARTHROPLASTY ANTERIOR DIRECT CEMENTED LEFT    Staff:  Surgeon(s):  Vishnu Jc MD    Assistant: Abby Yu PA-C    Anesthesia: Spinal    Estimated Blood Loss: 300    Implants:    Implant Name Type Inv. Item Serial No.  Lot No. LRB No. Used   SUT MAXBRAID 2 HC5 38IN T [id number] - [id number] Implant SUT MAXBRAID 2 HC5 38IN T [id number]  [employer]  Left 2   CMT BONE SIMPLEX/P TMYCIN FDOS 10PK - [id number] Implant CMT BONE SIMPLEX/P TMYCIN FDOS 10PK  [employer] [id number] Left 2   KT BONE BIO PREP 6EA C/S - [id number] Implant KT BONE BIO PREP 6EA C/S  [employer] [id number] Left 1   SHLL ACET OSSEOTI G7 4H SZG 60MM - [id number] Implant SHLL ACET OSSEOTI G7 4H SZG 60MM  [employer] [id number] Left 1   LINER G7 2MOBL SZG 46MM - [id number] Implant LINER G7 2MOBL SZG  46MM  [employer] [id number] Left 1   SCRW ACET DELVIN TRILOGY S/TAP 6.5X25 - [id number] Implant SCRW ACET DELVIN TRILOGY S/TAP 6.5X25  NANY [employer] [id number] Left 1   STEM FEM/HIP SIRIUS SZ 44/F - [id number] Implant STEM FEM/HIP SIRIUS SZ 44/F  [employer] [id number] Left 1   HD FEM/HIP BIOLOX DELTA CERAM NK 28MM STD - [id number] Implant HD FEM/HIP BIOLOX DELTA CERAM NK 28MM STD  [employer] [id number] Left 1   BEAR HIP ACT ARTICULATION ARCOMXL G7 DUALMOBL [id number] - [id number] Implant BEAR HIP ACT ARTICULATION ARCOMXL G7 DUALMOBL [id number]  [employer] [id number] Left 1       Specimen:          None      Findings: see operative dictation    Complications: none    Description of Procedure:   The patient was transferred to [facility] operating room preoperative antibiotics in form of Kefzol 2gm Given IV Prior to Skin Incision As Well As 1 G of Tranexemic Acid. Patient Received Spinal anesthesia and was transferred to the [place] Table. All Bony Prominences Were Padded Adequately. The Operative Hip Was Then Prepped and Draped in the Usual Sterile Fashion. Multiple timeouts Were Done Identifying the Correct Patient Surgical Site and Planned Procedure.  A Skin Incision Was Made Overlying the Anterior Lateral Aspect of the Hip for about 10 Cm Just below and Lateral to the Anterior Superior Iliac Spine. Skin and Subcutaneous Tissue and Fascia Was Incised in Line with the Skin Incision Overlying the Tensor Fascia Zeynep Muscle. The Tensor Muscle Was Then Retracted Laterally and the Interval between Sartorius and Rectus Femoris Medially and the Tensor Fascia Muscle Were Developed. The Lateral Femoral Circumflex Vessels Were Identified They Were Ligated without Difficulty. The deep Fascia Was Incised and the Capsule of the Hip Joint Where Was Identified. We then placed a soft tissue protecting device deep to the rectus and the tensor. Retractors were then Placed Extracapsular the Capsule Was Then Incised in a T-Shaped Manner  and the Anterior Posterior Capsules Were Then Tied with maxbraid suture . Following This Retractors Were Placed Intracapsularly. We Did Identify the Obvious signs of severe osteoarthritis upon Incising the Capsule. We Then Made Appropriate Releases Done on the Inferior Medial Neck and along the Saddle of the Femoral Neck Femoral Neck Remaining Was Identified and Osteotomy Was Done and According to the Preoperative Templating Is an Oscillating Saw. The Femoral Head and Cut Neck with Extracted Using a Corkscrew without Difficulty the Femoral Head Did Have Major Signs of Articular Cartilage Loss Loss or Superior Wear.  The Acetabular Cavity Was Exposed by Placing Retractors and taking Care to Protect the Anterior vascular Structures the Labrum Was Excised the pulvinar was Excised from the Cotyloid Fossa Acetabular Cavity Was Then Progressively Reamed Maintaining the Correct Inclination and Version under Image Intensifier Control. Adequate Medialization Was Obtained. Adequate Fit Was Found to Be Obtained with the Reamer Size of 59. The Biomet G7 Posterior Tied Cup Size 60 Was Then Impacted into Position. This Found to Seat Satisfactorily with Adequate Inclination and Anteversion. It Was Stable but we did Insert 1 6.5/25 Millimeter Lag Screw This Was Checked under Fluoroscopic Fluoroscopy and Found to Be in Good Position. Following this the cup was cleaned and then a dual mobility  neutral  impacted Following This the Periarticular Tissues Were Then Infiltrated with Local Anesthetic.  Attention Was Then Directed to the Proximal Femur the Proximal Femur Was Delivered Using a Trochanteric Hook Placed Just Distal to the Vastus Tubercle and Proximal to the gluet Idris Tendon. The leg was then Externally Rotated Gross Traction Released and Hyperextension and Adduction Was Done Using the Paz Table. Mechanical Lift on the Table Was Utilized to Support the Femur Appropriate Capsular Releases Were Made to Expose the Medial  Summit of the Greater Trochanter and Proximal Femur Was Delivered the Femoral Canal Was Then Entered by Removing Lateral Femoral Neck and with a Box Chisel by Serial Broaching Adequate Fit Was Found to Be Obtained with the Size 44 F broach. We then trialed a std neck was reduced fluoroscopic imaging was used to assess leg length and offset was found to be symmetric. The trials were then removed a #44 F sirius cemented standard offset was implanted in appropriate anteversion. It sat very similar to our broach trial we chose the std neck ceramic. This was then reduced again fluoroscopy remaining images both lateral and AP of the femur showed the stem to be in good position AP pelvis showed symmetric leg length and offset. The hip was then taken through a range of motion and found to be stable. There were no acute fractures there and wound was then thoroughly irrigated saline we did use of more of the injection cocktail. Betadine irrigation was used followed by 3L of saline irrigation. Soft tissue hemostasis was secured and topical TXA was used. Sponge and needle instrument counts were correct maxibraid sutures directly anterior and posterior capsular flaps were tied to each other then closed the fascial layer with a running #2 strata fix followed by 2-0 Vicryl and then Monocryl for the skin and skin affix. Once the skin affix had dried optifoam AG dressing placed over the top. The patient was then transferred to the recovery room in stable condition patient tolerated the procedure well there were no Medications the patient adequate distal pulses and good cap refill.  The patient will be mobilized by physical therapy received antibiotic prophylaxis postoperatively for 2 more doses given the first 24 hours. The patient was started on DVT prophylaxis with 81 mg aspirin twice daily on starting postoperative day #1.  I discussed the satisfactory performance of the procedure with the patient's family and discussed the  postoperative management.      Assistant Participation:  Surgeon(s):  Vishnu Jc MD    Assistant: Abby Yu PA-C assisted with proper preoperative positioning, preoperative templating, determingin availability of proper implants, prepping and draping of patient, manipulation placement of instruments, protection of ligaments and vital soft tissue structures, assistance in maintaining hemostasis and assistance with closure of the wound. Their skills and knowledge of the steps of operation and the desired outcome of each surgical step was crucial, allowing for efficient choreography of surgical procedure, and closure of the wound which lead to reduced surgical time, less blood loss, and less risk of complications for the patient.       Vishnu Jc MD     Date: 3/11/2020  Time: 13:45

## 2020-03-12 VITALS
HEIGHT: 72 IN | SYSTOLIC BLOOD PRESSURE: 138 MMHG | WEIGHT: 188 LBS | TEMPERATURE: 97.5 F | RESPIRATION RATE: 16 BRPM | OXYGEN SATURATION: 95 % | DIASTOLIC BLOOD PRESSURE: 83 MMHG | BODY MASS INDEX: 25.47 KG/M2 | HEART RATE: 72 BPM

## 2020-03-12 PROBLEM — D62 ACUTE BLOOD LOSS ANEMIA: Status: ACTIVE | Noted: 2020-03-12

## 2020-03-12 LAB
ANION GAP SERPL CALCULATED.3IONS-SCNC: 9 MMOL/L (ref 5–15)
BUN BLD-MCNC: 17 MG/DL (ref 8–23)
BUN/CREAT SERPL: 25 (ref 7–25)
CALCIUM SPEC-SCNC: 9 MG/DL (ref 8.6–10.5)
CHLORIDE SERPL-SCNC: 105 MMOL/L (ref 98–107)
CO2 SERPL-SCNC: 26 MMOL/L (ref 22–29)
CREAT BLD-MCNC: 0.68 MG/DL (ref 0.76–1.27)
DEPRECATED RDW RBC AUTO: 43 FL (ref 37–54)
ERYTHROCYTE [DISTWIDTH] IN BLOOD BY AUTOMATED COUNT: 12.3 % (ref 12.3–15.4)
GFR SERPL CREATININE-BSD FRML MDRD: 112 ML/MIN/1.73
GLUCOSE BLD-MCNC: 124 MG/DL (ref 65–99)
HCT VFR BLD AUTO: 34.6 % (ref 37.5–51)
HGB BLD-MCNC: 11.3 G/DL (ref 13–17.7)
MCH RBC QN AUTO: 31.1 PG (ref 26.6–33)
MCHC RBC AUTO-ENTMCNC: 32.7 G/DL (ref 31.5–35.7)
MCV RBC AUTO: 95.3 FL (ref 79–97)
PLATELET # BLD AUTO: 210 10*3/MM3 (ref 140–450)
PMV BLD AUTO: 10.3 FL (ref 6–12)
POTASSIUM BLD-SCNC: 3.6 MMOL/L (ref 3.5–5.2)
RBC # BLD AUTO: 3.63 10*6/MM3 (ref 4.14–5.8)
SODIUM BLD-SCNC: 140 MMOL/L (ref 136–145)
WBC NRBC COR # BLD: 15.38 10*3/MM3 (ref 3.4–10.8)

## 2020-03-12 PROCEDURE — 94799 UNLISTED PULMONARY SVC/PX: CPT

## 2020-03-12 PROCEDURE — 80048 BASIC METABOLIC PNL TOTAL CA: CPT | Performed by: ORTHOPAEDIC SURGERY

## 2020-03-12 PROCEDURE — 85027 COMPLETE CBC AUTOMATED: CPT | Performed by: NURSE PRACTITIONER

## 2020-03-12 PROCEDURE — 25010000003 CEFAZOLIN IN DEXTROSE 2-4 GM/100ML-% SOLUTION: Performed by: ORTHOPAEDIC SURGERY

## 2020-03-12 PROCEDURE — 97116 GAIT TRAINING THERAPY: CPT

## 2020-03-12 PROCEDURE — 97165 OT EVAL LOW COMPLEX 30 MIN: CPT | Performed by: OCCUPATIONAL THERAPIST

## 2020-03-12 PROCEDURE — 97535 SELF CARE MNGMENT TRAINING: CPT | Performed by: OCCUPATIONAL THERAPIST

## 2020-03-12 PROCEDURE — 97110 THERAPEUTIC EXERCISES: CPT

## 2020-03-12 RX ORDER — PSEUDOEPHEDRINE HCL 30 MG
100 TABLET ORAL 2 TIMES DAILY PRN
Start: 2020-03-12 | End: 2020-03-26

## 2020-03-12 RX ORDER — CLOPIDOGREL BISULFATE 75 MG/1
75 TABLET ORAL DAILY
Start: 2020-03-13 | End: 2021-08-31

## 2020-03-12 RX ORDER — HYDROCODONE BITARTRATE AND ACETAMINOPHEN 7.5; 325 MG/1; MG/1
1 TABLET ORAL EVERY 6 HOURS PRN
Start: 2020-03-12 | End: 2021-08-31

## 2020-03-12 RX ADMIN — ACETAMINOPHEN 1000 MG: 500 TABLET, FILM COATED ORAL at 08:24

## 2020-03-12 RX ADMIN — OXYCODONE HYDROCHLORIDE 5 MG: 5 TABLET ORAL at 08:25

## 2020-03-12 RX ADMIN — LEVOTHYROXINE SODIUM 75 MCG: 75 TABLET ORAL at 08:25

## 2020-03-12 RX ADMIN — MELOXICAM 15 MG: 7.5 TABLET ORAL at 08:25

## 2020-03-12 RX ADMIN — Medication 1 CAPSULE: at 08:25

## 2020-03-12 RX ADMIN — LISINOPRIL 40 MG: 40 TABLET ORAL at 08:25

## 2020-03-12 RX ADMIN — DOCUSATE SODIUM 100 MG: 100 CAPSULE, LIQUID FILLED ORAL at 08:25

## 2020-03-12 RX ADMIN — DILTIAZEM HYDROCHLORIDE 180 MG: 180 CAPSULE, COATED, EXTENDED RELEASE ORAL at 08:25

## 2020-03-12 RX ADMIN — METOPROLOL SUCCINATE 100 MG: 100 TABLET, EXTENDED RELEASE ORAL at 08:25

## 2020-03-12 RX ADMIN — ROSUVASTATIN CALCIUM 10 MG: 10 TABLET, COATED ORAL at 08:25

## 2020-03-12 RX ADMIN — ASPIRIN 81 MG: 81 TABLET, COATED ORAL at 08:25

## 2020-03-12 RX ADMIN — CEFAZOLIN SODIUM 2 G: 2 INJECTION, SOLUTION INTRAVENOUS at 03:00

## 2020-03-12 RX ADMIN — ACETAMINOPHEN 1000 MG: 500 TABLET, FILM COATED ORAL at 03:00

## 2020-03-12 NOTE — THERAPY TREATMENT NOTE
Patient Name: Benito Forman  : 1940    MRN: [id number]                              Today's Date: 3/12/2020       Admit Date: 3/11/2020    Visit Dx:     ICD-10-CM ICD-9-CM   1. Impaired functional mobility, balance, gait, and endurance Z74.09 V49.89   2. Arthritis of left hip M16.12 716.95   3. Status post total replacement of left hip Z96.642 V43.64     Patient Active Problem List   Diagnosis   • Arthritis of left hip   • Status post total replacement of left hip   • Hypertension   • Hypothyroid   • Hyperlipidemia     Past Medical History:   Diagnosis Date   • Arthritis    • Disease of thyroid gland    • Elevated cholesterol    • Hearing aid worn    • Heart attack (CMS/HCC)    • Hypertension    • Irregular heart beat    • Wears dentures    • Wears glasses      Past Surgical History:   Procedure Laterality Date   • CARDIAC CATHETERIZATION     • CHOLECYSTECTOMY     • COLONOSCOPY     • CORONARY ANGIOPLASTY WITH STENT PLACEMENT     • ELBOW FUSION     • PACEMAKER IMPLANTATION     • POLYPECTOMY     • TOTAL HIP ARTHROPLASTY Left 3/11/2020    Procedure: TOTAL HIP ARTHROPLASTY ANTERIOR DIRECT CEMENTED LEFT;  Surgeon: Vishnu Jc MD;  Location: [facility];  Service: Orthopedics;  Laterality: Left;     General Information     Row Name 2051          PT Evaluation Time/Intention    Document Type  evaluation  -EJ     Mode of Treatment  physical therapy;individual therapy  -EJ     Row Name 20          General Information    Patient Profile Reviewed?  yes  -EJ     Existing Precautions/Restrictions  fall;left;hip, anterior;other (see comments) [place]  -EJ     Barriers to Rehab  previous functional deficit  -EJ     Row Name 20          Cognitive Assessment/Intervention- PT/OT    Orientation Status (Cognition)  oriented x 4  -EJ     Row Name 20          Safety Issues, Functional Mobility    Safety Issues Affecting Function (Mobility)  awareness of need for assistance;insight  into deficits/self awareness;sequencing abilities;safety precaution awareness;safety precautions follow-through/compliance  -EJ     Impairments Affecting Function (Mobility)  pain;strength;range of motion (ROM)  -EJ       User Key  (r) = Recorded By, (t) = Taken By, (c) = Cosigned By    Initials Name Provider Type    Ligia Calhoun PT Physical Therapist        Mobility     Row Name 03/12/20 0953          Bed Mobility Assessment/Treatment    Comment (Bed Mobility)  UIC  -EJ     Row Name 03/12/20 0953          Transfer Assessment/Treatment    Comment (Transfers)  Pt demonstrated proper hand placement without cueing.  -EJ     Row Name 03/12/20 0953          Sit-Stand Transfer    Sit-Stand [place] (Transfers)  verbal cues;2 person assist;contact guard  -EJ     Assistive Device (Sit-Stand Transfers)  walker, front-wheeled  -EJ     Row Name 03/12/20 0953          Gait/Stairs Assessment/Training    [place] Level (Gait)  verbal cues;contact guard;2 person assist  -EJ     Assistive Device (Gait)  walker, front-wheeled  -EJ     Distance in Feet (Gait)  300  -EJ     Pattern (Gait)  step-through  -EJ     Deviations/Abnormal Patterns (Gait)  bilateral deviations;bethany decreased;gait speed decreased;stride length decreased;antalgic  -EJ     Bilateral Gait Deviations  forward flexed posture;heel strike decreased  -EJ     Left Sided Gait Deviations  weight shift ability decreased  -EJ     [place] Level (Stairs)  verbal cues;contact guard;2 person assist  -EJ     Assistive Device (Stairs)  walker, front-wheeled  -EJ     Handrail Location (Stairs)  none  -EJ     Number of Steps (Stairs)  1  -EJ     Ascending Technique (Stairs)  step-to-step  -EJ     Descending Technique (Stairs)  step-to-step  -EJ     Comment (Gait/Stairs)  Pt ambulated with step through gait pattern at slow pace with forward flexed posture. Pt cued for increased step length LLE and upright posture. Pt demonstrated appropriate heel strike.  Good improvement with fluidity. Pt educated on step to gait pattern for ascending 1 step with walker.   -     Row Name 03/12/20 0953          Mobility Assessment/Intervention    Extremity Weight-bearing Status  left lower extremity  -EJ     Left Lower Extremity (Weight-bearing Status)  weight-bearing as tolerated (WBAT)  -EJ       User Key  (r) = Recorded By, (t) = Taken By, (c) = Cosigned By    Initials Name Provider Type    Ligia Calhoun PT Physical Therapist        Obj/Interventions     Row Name 03/12/20 1003          Therapeutic Exercise    Lower Extremity (Therapeutic Exercise)  quad sets, bilateral;SLR (straight leg raise), left;LAQ (long arc quad), left;heel slides, left;marching while seated  -EJ     Lower Extremity Range of Motion (Therapeutic Exercise)  ankle dorsiflexion/plantar flexion, bilateral;hip abduction/adduction, left;hip flexion/extension, left  -EJ     Exercise Type (Therapeutic Exercise)  AROM (active range of motion);isometric contraction, static  -EJ     Position (Therapeutic Exercise)  supine  -EJ     Sets/Reps (Therapeutic Exercise)  15x each  -EJ       User Key  (r) = Recorded By, (t) = Taken By, (c) = Cosigned By    Initials Name Provider Type    Ligia Calhoun PT Physical Therapist        Goals/Plan    No documentation.       Clinical Impression     Row Name 03/12/20 1005          Pain Assessment    Additional Documentation  Pain Scale: Numbers Pre/Post-Treatment (Group)  -EJ     Row Name 03/12/20 1005          Pain Scale: Numbers Pre/Post-Treatment    Pain Scale: Numbers, Pretreatment  0/10 - no pain  -EJ     Pain Scale: Numbers, Post-Treatment  0/10 - no pain  -EJ     Pain Location - Side  Left  -EJ     Pain Location - Orientation  anterior  -EJ     Pain Location  hip  -EJ     Pain Intervention(s)  Ambulation/increased activity;Repositioned;Medication (See MAR);Cold applied  -     Row Name 03/12/20 1005          Plan of Care Review    Plan of Care Reviewed With   patient  -EJ     Progress  improving  -EJ     Outcome Summary  Pt ambulated 300 ft with CGA using RWx, step through gait pattern. Pt ascended/decended 1 stair. He reported understanding of precautions and HEP. Pt expected to d/c home with OPPT set up for tomorrow.   -EJ     Row Name 03/12/20 1005          Positioning and Restraints    Pre-Treatment Position  sitting in chair/recliner  -EJ     Post Treatment Position  chair  -EJ     In Chair  reclined;call light within reach;encouraged to call for assist;with family/caregiver;with other staff ice pack donned.  -EJ       User Key  (r) = Recorded By, (t) = Taken By, (c) = Cosigned By    Initials Name Provider Type    Ligia Calhoun PT Physical Therapist        Outcome Measures     Row Name 03/12/20 1007          How much help from another person do you currently need...    Turning from your back to your side while in flat bed without using bedrails?  4  -EJ     Moving from lying on back to sitting on the side of a flat bed without bedrails?  3  -EJ     Moving to and from a bed to a chair (including a wheelchair)?  3  -EJ     Standing up from a chair using your arms (e.g., wheelchair, bedside chair)?  4  -EJ     Climbing 3-5 steps with a railing?  3  -EJ     To walk in hospital room?  3  -EJ     AM-PAC 6 Clicks Score (PT)  20  -EJ     Row Name 03/12/20 1007          Functional Assessment    Outcome Measure Options  AM-PAC 6 Clicks Basic Mobility (PT)  -EJ       User Key  (r) = Recorded By, (t) = Taken By, (c) = Cosigned By    Initials Name Provider Type    Ligia Calhoun PT Physical Therapist        Physical Therapy Education                 Title: PT OT SLP Therapies (Done)     Topic: Physical Therapy (Done)     Point: Mobility training (Done)     Description:   Instruct learner(s) on safety and technique for assisting patient out of bed, chair or wheelchair.  Instruct in the proper use of assistive devices, such as walker, crutches, cane or brace.               Patient Friendly Description:   It's important to get you on your feet again, but we need to do so in a way that is safe for you. Falling has serious consequences, and your personal safety is the most important thing of all.        When it's time to get out of bed, one of us or a family member will sit next to you on the bed to give you support.     If your doctor or nurse tells you to use a walker, crutches, a cane, or a brace, be sure you use it every time you get out of bed, even if you think you don't need it.    Learning Progress Summary           Patient Acceptance, E,TB,H, VU,DU by QUYNH at 3/12/2020 1008    Acceptance, E,D, VU,NR by LR at 3/11/2020 1551    Comment:  Educated on anterior hip precautions, weight bearing status, correct supine to sit t/f technique, correct sit<->stand t/f technique, correct gait mechanics, and progression of POC.   Family Acceptance, E,D, VU,NR by LR at 3/11/2020 1551    Comment:  Educated on anterior hip precautions, weight bearing status, correct supine to sit t/f technique, correct sit<->stand t/f technique, correct gait mechanics, and progression of POC.   Significant Other Acceptance, E,D, VU,NR by LR at 3/11/2020 1551    Comment:  Educated on anterior hip precautions, weight bearing status, correct supine to sit t/f technique, correct sit<->stand t/f technique, correct gait mechanics, and progression of POC.                   Point: Home exercise program (Done)     Description:   Instruct learner(s) on appropriate technique for monitoring, assisting and/or progressing patient with therapeutic exercises and activities.              Learning Progress Summary           Patient Acceptance, E,TB,H, VU,DU by QUYNH at 3/12/2020 1008    Acceptance, E,D, VU,NR by LR at 3/11/2020 1551    Comment:  Educated on anterior hip precautions, weight bearing status, correct supine to sit t/f technique, correct sit<->stand t/f technique, correct gait mechanics, and progression of POC.    Family Acceptance, E,D, VU,NR by LR at 3/11/2020 1551    Comment:  Educated on anterior hip precautions, weight bearing status, correct supine to sit t/f technique, correct sit<->stand t/f technique, correct gait mechanics, and progression of POC.   Significant Other Acceptance, E,D, VU,NR by LR at 3/11/2020 1551    Comment:  Educated on anterior hip precautions, weight bearing status, correct supine to sit t/f technique, correct sit<->stand t/f technique, correct gait mechanics, and progression of POC.                   Point: Body mechanics (Done)     Description:   Instruct learner(s) on proper positioning and spine alignment for patient and/or caregiver during mobility tasks and/or exercises.              Learning Progress Summary           Patient Acceptance, E,TB,H, VU,DU by EJ at 3/12/2020 1008    Acceptance, E,D, VU,NR by LR at 3/11/2020 1551    Comment:  Educated on anterior hip precautions, weight bearing status, correct supine to sit t/f technique, correct sit<->stand t/f technique, correct gait mechanics, and progression of POC.   Family Acceptance, E,D, VU,NR by LR at 3/11/2020 1551    Comment:  Educated on anterior hip precautions, weight bearing status, correct supine to sit t/f technique, correct sit<->stand t/f technique, correct gait mechanics, and progression of POC.   Significant Other Acceptance, E,D, VU,NR by LR at 3/11/2020 1551    Comment:  Educated on anterior hip precautions, weight bearing status, correct supine to sit t/f technique, correct sit<->stand t/f technique, correct gait mechanics, and progression of POC.                   Point: Precautions (Done)     Description:   Instruct learner(s) on prescribed precautions during mobility and gait tasks              Learning Progress Summary           Patient Acceptance, E,TB,H, VU,DU by EJ at 3/12/2020 1008    Acceptance, E,D, VU,NR by LR at 3/11/2020 1551    Comment:  Educated on anterior hip precautions, weight bearing status, correct  supine to sit t/f technique, correct sit<->stand t/f technique, correct gait mechanics, and progression of POC.   Family Acceptance, E,D, VU,NR by LR at 3/11/2020 1551    Comment:  Educated on anterior hip precautions, weight bearing status, correct supine to sit t/f technique, correct sit<->stand t/f technique, correct gait mechanics, and progression of POC.   Significant Other Acceptance, E,D, VU,NR by LR at 3/11/2020 1551    Comment:  Educated on anterior hip precautions, weight bearing status, correct supine to sit t/f technique, correct sit<->stand t/f technique, correct gait mechanics, and progression of POC.                               User Key     Initials Effective Dates Name Provider Type Discipline     11/20/18 -  Ligia Oliver PT Physical Therapist PT    LR 06/19/15 -  Saloni Marc PT Physical Therapist PT              PT Recommendation and Plan     Outcome Summary/Treatment Plan (PT)  Anticipated Equipment Needs at Discharge (PT): front wheeled walker  Plan of Care Reviewed With: patient  Progress: improving  Outcome Summary: Pt ambulated 300 ft with CGA using RWx, step through gait pattern. Pt ascended/decended 1 stair. He reported understanding of precautions and HEP. Pt expected to d/c home with OPPT set up for tomorrow.      Time Calculation:   PT Charges     Row Name 03/12/20 1008             Time Calculation    Start Time  0949  -      PT Received On  03/12/20  -         Time Calculation- PT    Total Timed Code Minutes- PT  29 minute(s)  -EJ         Timed Charges    82339 - PT Therapeutic Exercise Minutes  10  -EJ      11483 - Gait Training Minutes   19  -EJ        User Key  (r) = Recorded By, (t) = Taken By, (c) = Cosigned By    Initials Name Provider Type     Ligia Oliver PT Physical Therapist        Therapy Charges for Today     Code Description Service Date Service Provider Modifiers Qty    10659616915 HC PT THER PROC EA 15 MIN 3/12/2020 Ligia Oliver PT  GP 1    11505435065 HC GAIT TRAINING EA 15 MIN 3/12/2020 Ligia Oliver, PT GP 1          PT G-Codes  Outcome Measure Options: AM-PAC 6 Clicks Basic Mobility (PT)  AM-PAC 6 Clicks Score (PT): 20    Ligia Junior, PT  3/12/2020

## 2020-03-12 NOTE — PLAN OF CARE
Problem: Patient Care Overview  Goal: Plan of Care Review  Flowsheets (Taken 3/12/2020 1005)  Progress: improving  Plan of Care Reviewed With: patient  Outcome Summary: Pt ambulated 300 ft with CGA using RWx, step through gait pattern. Pt ascended/decended 1 stair. He reported understanding of precautions and HEP. Pt expected to d/c home with OPPT set up for tomorrow.

## 2020-03-12 NOTE — DISCHARGE INSTR - ACTIVITY
Advance activity as tolerated   Weight bearing as tolerated on left lower extremity  Waterproof dressing in place may shower

## 2020-03-12 NOTE — PLAN OF CARE
Problem: Patient Care Overview  Goal: Plan of Care Review  Flowsheets  Taken 3/12/2020 1048  Plan of Care Reviewed With: patient  Taken 3/12/2020 1146  Outcome Summary: OT educated pt on ADL retraining and transfer training. Pt has self-care kit, OT reviewed correct use of equipment. He completed LB dressing with CGA/supervision and transfer training with supervision. Post pain 2/10 left incision hip. Recommend DC home with family assist and RW.

## 2020-03-12 NOTE — PROGRESS NOTES
Orthopedic Progress Note      Patient: Benito Forman  YOB: 1940     Date of Admission: 3/11/2020 10:21 AM Medical Record Number: [id number]     Attending Physician: Vishnu Jc MD    Status Post:  Procedure(s):  TOTAL HIP ARTHROPLASTY ANTERIOR DIRECT CEMENTED LEFT Post Operative Day Number: 1    Subjective : No new orthopaedic complaints     Pain Relief: some relief with present medication.     Systemic Complaints: No Complaints  Vitals:    03/11/20 2300 03/12/20 0508 03/12/20 0824 03/12/20 0845   BP: 136/90 131/82 138/83    BP Location: Left arm Left arm     Patient Position: Lying Lying     Pulse: 70 70 70 72   Resp: 16 16  16   Temp: 98 °F (36.7 °C) 98.2 °F (36.8 °C)  97.5 °F (36.4 °C)   TempSrc: Oral Oral     SpO2: 94% 95%     Weight:       Height:           Physical Exam: 79 y.o. male    General Appearance:       Alert, cooperative, in no acute distress                  Extremities:    Dressing Clean, Dry and Intact             No clinical sign of DVT        Able to do good movements of digits    Pulses:   Pulses palpable and equal bilaterally           Diagnostic Tests:     Results from last 7 days   Lab Units 03/12/20  0616   WBC 10*3/mm3 15.38*   HEMOGLOBIN g/dL 11.3*   HEMATOCRIT % 34.6*   PLATELETS 10*3/mm3 210     Results from last 7 days   Lab Units 03/12/20  0616   SODIUM mmol/L 140   POTASSIUM mmol/L 3.6   CHLORIDE mmol/L 105   CO2 mmol/L 26.0   BUN mg/dL 17   CREATININE mg/dL 0.68*   GLUCOSE mg/dL 124*   CALCIUM mg/dL 9.0         No results found for: URICACID  No results found for: CRYSTAL  Microbiology Results (last 10 days)     Procedure Component Value - Date/Time    MRSA Screen Culture - Swab, Nares [[id number]]  (Normal) Collected:  03/03/20 1432    Lab Status:  Final result Specimen:  Swab from Nares Updated:  03/05/20 1116     MRSA Screen Cx No Methicillin Resistant Staphylococcus aureus isolated        Fl C Arm During Surgery    Result Date: 3/11/2020  Intraoperative  fluoroscopy utilized during left total hip arthroplasty.  D:  03/11/2020 E:  03/11/2020    This report was finalized on 3/11/2020 5:31 PM by Dr. Jameel Recinos.      Xr Hip With Or Without Pelvis 2 - 3 View Left    Result Date: 3/11/2020  No acute bony abnormality with postsurgical changes seen within the soft tissues and expected postsurgical changes seen from total left hip arthroplasty.  D:  03/11/2020 E:  03/11/2020  This report was finalized on 3/11/2020 4:59 PM by Dr. Nora Linares MD.          Current Medications:  Scheduled Meds:  acetaminophen 1,000 mg Oral Q8H   aspirin 81 mg Oral Q12H   dilTIAZem  mg Oral Daily   lactobacillus acidophilus 1 capsule Oral Daily   levothyroxine 75 mcg Oral Daily   lisinopril 40 mg Oral Daily   meloxicam 15 mg Oral Daily   metoprolol succinate  mg Oral Daily   rosuvastatin 10 mg Oral Daily     Continuous Infusions:  lactated ringers 9 mL/hr Last Rate: 9 mL/hr (03/11/20 1052)   lactated ringers 100 mL/hr Last Rate: 100 mL/hr (03/11/20 1537)     PRN Meds:.bisacodyl  •  bisacodyl  •  docusate sodium  •  HYDROmorphone **AND** naloxone  •  ketorolac  •  labetalol  •  ondansetron  •  oxyCODONE  •  oxyCODONE  •  sennosides-docusate  •  sodium chloride  •  traMADol    Assessment: Status post  TOTAL HIP ARTHROPLASTY ANTERIOR DIRECT LEFT    Patient Active Problem List   Diagnosis   • Arthritis of left hip   • Status post total replacement of left hip   • Hypertension   • Hypothyroid   • Hyperlipidemia       PLAN:   Continues current post-op course  Anticoagulation: Aspirin started  Mobilize with PT as tolerated per protocol    Weight Bearing: WBAT  Discharge Plan: OK to plan for discharge in  today to home  from orthopadic perspective.    Vishnu Jc MD    Date: 3/12/2020    Time: 11:43

## 2020-03-12 NOTE — DISCHARGE INSTRUCTIONS
[facility] Primary Hip and Knee:  1. You have a sterile dressing in place. Only exchange the dressing if it becomes saturated (fluid draining out the sides of dressing) and notify the office. The dressing is water resistant. During the first 7 days after surgery, it is ok to shower. DO NOT scrub on or around the dressing. Do not submerge the dressing.  2. After 7 days, you can remove your dressing. Your sutures are all underneath your skin. There is a layer of glue over the incision. DO NOT pick at this or try to peal it off. If the edges do peel up it is ok to trim them as needed. It is OK to shower with the incision exposed. DO NOT scrub on or around the incision. DO NOT submerge the incision in pools, baths, or hot tubs for 1 month after surgery.  3. No creams or ointments to the incision for 1 month after surgery. After 1 month, it is recommended to massage the scar with vitamin E cream to help decrease scar formation.  4. Check incision every day and notify surgeon immediately if any of the following signs or symptoms are noted:  o Increase in redness  o Increase in swelling around the incision and of the entire extremity  o Increase in pain  o Drainage oozing from the incision  o Pulling apart of the edges of the incision  o Increase in overall body temperature (greater than 100.5 degrees)    Anticoagulants: You will be discharged on an anticoagulant. This is a prophylactic medication that helps prevent blood clots during your post-operative period. Most patients will be on ***Aspirin 81 mg Enteric coated every 12 hours orally for 30 days. Some patients, due to increased risk factors, will need to be on a stronger anticoagulant. Dr. Jc will discuss this need with you.   ? While taking the anticoagulant, you should avoid taking any additional aspirin, and limit ibuprofen (Advil or Motrin), Aleve (Naprosyn) or other non-steroidal anti-inflammatory medications.   ? Notify your surgeon immediately if  any selina bleeding is noted in the urine, stool, vomit, or from the nose or the incision. Blood in the stool will often appear as black rather than red. Blood in urine may appear as pink. Blood in vomit may appear as brown/black like coffee grounds.  ? You will need to apply pressure for longer periods of time to any cuts or abrasions to stop bleeding  ? Avoid alcohol while taking anticoagulants  Sequential Compression Device: You maybe be discharged home with a compression device that helps promote blood flow and prevent clots in your legs. Wear these at all times for the first two weeks.   Mobilization: The best way to avoid a blood clot is to get up and walk. 10 times a day get up and walk for 5 minutes for the first two weeks. Walking for longer periods of time will increase pain and swelling, making therapy more difficult. If taking any long travel (car or plane) in the first 1-2 months, be sure to get up and walk at least every one hour.     Stool Softeners: You will be at greater risk of constipation after surgery because of being less mobile and taking the pain medications.   ? Take stool softeners as instructed by your surgeon while on pain medications. Use over the counter Colace 100 mg 1-2 capsules twice daily.   ? If stools become too loose or too frequent, decreases the dosage or stop the stool softener.  ? If constipation occurs despite use of stool softeners, you are to continue the stool softeners and add a laxative (Milk of Magnesia 1 ounce daily as needed).  ? Dulcolax oral tabs or suppository or a fleets enema can also be utilized for constipation and can be obtained over the counter.   ? If above interventions are unsuccessful in inducing bowel movements, please contact your family physician's office/surgeon's office.  ? Drink plenty of fluids and eat fruits and vegetables during your recovery time    Pain Medications utilized after surgery are narcotics and the law requires that the following  information be given to all patients that are prescribed narcotics:  ? CLASSIFICATION: Pain medications are called Opioids and are narcotics  ? LEGALITIES: It is illegal to share narcotics with others and to drive within 24 hours of taking narcotics  ? POTENTIAL SIDE EFFECTS: Potential side effects of opioids include: nausea, vomiting, itching, dizziness, drowsiness, dry mouth, constipation, and difficulty urinating.  ? POTENTIAL ADVERSE EFFECTS:   o Opioid tolerance can develop with use of pain medications and this simply means that it requires more and more of the medication to control pain; however, this is seen more in patients that use Opioids for longer periods of time.  o Opioid dependence can develop with use of Opioids and this simply means that to stop the medication can cause withdrawal symptoms; however, this is seen with patients that use Opioids for longer periods of time.  o Opioid addiction can develop with use of Opioids and the incidence of this is very unlikely in patients who take the medications as ordered and stop the medications as instructed.  o Opioid overdose can be dangerous, but is unlikely when the medication is taken as ordered and stopped when ordered. It is important not to mix opioids with alcohol or with any type of sedative, such as Benadryl, as this can lead to over sedation and respiratory difficulty.  ? DOSAGE:   o Pain medications may be needed consistently for the first week to decrease pain and promote adequate pain relief and participation in physical therapy.  o After the initial surgical pain begins to resolve, you may begin to decrease the pain medication and only take it as needed. By the end of 6 weeks, you should be off of pain medications.  o You can decrease your pain medication consumption by slowly spacing out the time in between the medication, and using 650mg Tylenol when the pain is not as severe. Do not exceed 3500mg of Tylenol in 24 hours.   o Refills will not  be given by the office during evening hours, on weekends, or after 6 weeks post-op.  o To seek refills on pain medications during the initial 6 week post-operative period, you must call the office 48 hours in advance to request the refill. The office will then notify you when to  the prescription. DO NOT wait until you are out of the medication to request a refill.

## 2020-03-12 NOTE — PROGRESS NOTES
Discharge Planning Assessment  [facility]     Patient Name: Benito Forman  MRN: [id number]  Today's Date: 3/12/2020    Admit Date: 3/11/2020    Discharge Needs Assessment     Row Name 03/12/20 0926       Living Environment    Name(s) of Who Lives With Patient  Shanthi Garcia ( wife)     Current Living Arrangements  home/apartment/condo    Primary Care Provided by  self    Provides Primary Care For  no one    Family Caregiver if Needed  spouse    Family Caregiver Names  Shanthi ( spouse) and Valdemar (son)    Quality of Family Relationships  supportive;helpful    Able to Return to Prior Arrangements  yes       Resource/Environmental Concerns    Resource/Environmental Concerns  home accessibility    Home Accessibility Concerns  stairs to enter home one step to enter, 3 level home, bedroom on main level       Transition Planning    Patient/Family Anticipates Transition to  home with family    Patient/Family Anticipated Services at Transition  outpatient care;durable medical equipment    Transportation Anticipated  family or friend will provide       Discharge Needs Assessment    Readmission Within the Last 30 Days  no previous admission in last 30 days    Concerns to be Addressed  basic needs;discharge planning    Equipment Currently Used at Home  cane, straight    Equipment Needed After Discharge  walker, rolling    Outpatient/Agency/Support Group Needs  outpatient therapy    Provided Post Acute Provider List?  N/A    N/A Provider List Comment  Arrangements made for outpt PT by MD office prior to admit    Current Discharge Risk  dependent with mobility/activities of daily living        Discharge Plan     Row Name 03/12/20 0931       Plan    Plan  Home with outpt PT    Patient/Family in Agreement with Plan  yes    Plan Comments  I met with Mr Forman to discuss d/c plan. his plan is to return home. He lives with wife who will be able to assist, along with son. Prior to admit he was independent with all ADL's,driving,etc. He  plans to go to outpt PT at [facility] in [place] [phone]. I spoke with Laura who confirmed his appt for 3/13 at 1pm. He will need a rolling walker. Arrangments made for [employer] to deliver one to room. No other d/c needs identified.    Final Discharge Disposition Code  01 - home or self-care        Destination      Coordination has not been started for this encounter.      Durable Medical Equipment      Coordination has not been started for this encounter.      Dialysis/Infusion      Coordination has not been started for this encounter.      Home Medical Care      Coordination has not been started for this encounter.      Therapy      Coordination has not been started for this encounter.      Community Resources      Coordination has not been started for this encounter.        Expected Discharge Date and Time     Expected Discharge Date Expected Discharge Time    Mar 13, 2020         Demographic Summary     Row Name 03/12/20 0962       General Information    Admission Type  inpatient    Arrived From  home    Reason for Consult  discharge planning    Preferred Language  English    General Information Comments  PCP - Andrew Jon       Contact Information    Permission Granted to Share Info With          Functional Status     Row Name 03/12/20 0926       Functional Status    Usual Activity Tolerance  good       Functional Status, IADL    Medications  independent    Meal Preparation  independent    Housekeeping  independent    Laundry  independent    Shopping  independent       Mental Status    General Appearance WDL  WDL       Mental Status Summary    Recent Changes in Mental Status/Cognitive Functioning  no changes       Employment/    Employment Status  retired    Employment/ Comments  insurance- Medicare and [employer] [place]        Psychosocial    No documentation.       Abuse/Neglect    No documentation.       Legal    No documentation.       Substance Abuse    No documentation.        Patient Forms    No documentation.           Sonja C Kellerman, RN

## 2020-03-12 NOTE — PLAN OF CARE
Problem: Patient Care Overview  Goal: Plan of Care Review  Outcome: Ongoing (interventions implemented as appropriate)  Flowsheets (Taken 3/12/2020 6683)  Progress: improving  Plan of Care Reviewed With: patient  Note:   Patient alert and oriented. No c/o pain. VSS. Voiding spontaneously. Slept throughout the night.

## 2020-03-12 NOTE — DISCHARGE SUMMARY
Patient Name: Benito Forman  MRN: [id number]  : 1940  DOS: 3/12/2020    Attending: Mikaela Brennan MD    Primary Care Provider: Andrew Jon    Date of Admission:.3/11/2020 10:21 AM    Date of Discharge:  3/12/2020    Discharge Diagnosis:     Status post total replacement of left hip    Arthritis of left hip    Hypertension    Hypothyroid    Hyperlipidemia    Acute blood loss anemia, mild asymptomatic    Hospital Course  At admit  Patient is a 79 y.o. male presented for left total hip arthroplasty by Dr. Jc.     He underwent surgery under spinal anesthesia.  He tolerated surgery well and is admitted for further medical management. His knee has been painful for many years. He uses a cane for ambulation. He reports a recent fall.     When seen in PACU he is doing well. He denies pain, but is still under effects of spinal block. He denies nausea, shortness of breath or chest pain. No hx of DVT or PE.     He has hx of HTN, HLD and CAD. He is followed by Dr. Jimenez, cardiology, and had preop cardiac clearance.    After admit:  The patient has done well postop. The patient has been able to ambulate 300 feet with PT.    The patient has had good pain control with PO pain medications.  Adjustments were made to pain medications to optimize postop pain management. Risks and benefits of opiate medications discussed with patient.    The patient was placed on DVT prophylaxis including aspirin, Plavix is resumed following discharge. The patient was encouraged to use IS for atelectasis prophylaxis.     The patient was placed on a bowel regimen to prevent constipation while on pain medication.   The patient's H/H was monitored with a slight decrease that remained asymptomatic.    It is felt by all involved that the patient can discharge home at this time, and the patient has no further questions  Procedures Performed  Procedure(s):  TOTAL HIP ARTHROPLASTY ANTERIOR DIRECT CEMENTED LEFT       Pertinent Test  "Results:    I reviewed the patient's new clinical results.   Results from last 7 days   Lab Units 20  0616   WBC 10*3/mm3 15.38*   HEMOGLOBIN g/dL 11.3*   HEMATOCRIT % 34.6*   PLATELETS 10*3/mm3 210     Results from last 7 days   Lab Units 20  0616   SODIUM mmol/L 140   POTASSIUM mmol/L 3.6   CHLORIDE mmol/L 105   CO2 mmol/L 26.0   BUN mg/dL 17   CREATININE mg/dL 0.68*   CALCIUM mg/dL 9.0   GLUCOSE mg/dL 124*     I reviewed the patient's new imaging including images and reports.    Physical therapyPlan of Care Reviewed With: patient  Outcome Summary: Pt ambulated 300 ft with CGA using RWx, step through gait pattern. Pt ascended/decended 1 stair. He reported understanding of precautions and HEP. Pt expected to d/c home with OPPT set up for tomorrow.     Discharge Assessment:    Vital Signs  Visit Vitals  /83   Pulse 72   Temp 97.5 °F (36.4 °C)   Resp 16   Ht 182.9 cm (72\")   Wt 85.3 kg (188 lb)   SpO2 95%   BMI 25.50 kg/m²     Temp (24hrs), Av.5 °F (36.4 °C), Min:96.7 °F (35.9 °C), Max:98.2 °F (36.8 °C)      General Appearance:    Alert, cooperative, in no acute distress   Lungs:     Clear to auscultation,respirations regular, even and                   unlabored    Heart:    Regular rhythm and normal rate, normal S1 and S2    Abdomen:     Normal bowel sounds, no masses, no organomegaly, soft        non-tender, non-distended, no guarding, no rebound                 tenderness   Extremities:  Left hip dressing clean dry and intact.  No clubbing cyanosis or edema distal lower extremities.   Pulses:   Pulses palpable and equal bilaterally   Skin:   No bleeding, bruising or rash   Neurologic:   Cranial nerves 2 - 12 grossly intact, sensation intact, Flexion and dorsiflexion intact bilateral feet.         Discharge Disposition: Home.        Discharge Medications      New Medications      Instructions Start Date   docusate sodium 100 MG capsule   100 mg, Oral, 2 Times Daily PRN    "   HYDROcodone-acetaminophen 7.5-325 MG per tablet  Commonly known as:  NORCO   1 tablet, Oral, Every 6 Hours PRN         Continue These Medications      Instructions Start Date   aspirin 81 MG EC tablet   81 mg, Oral, Daily, Was instructed by Dr. Jc's office to have last dose 2-29-20       clopidogrel 75 MG tablet  Commonly known as:  PLAVIX   75 mg, Oral, Daily, Last dose 2-29-20 per Dr. Jc's orders    Start Date:  March 13, 2020     dilTIAZem  MG 24 hr capsule  Commonly known as:  CARDIZEM CD   180 mg, Oral, Daily      levothyroxine 75 MCG tablet  Commonly known as:  SYNTHROID, LEVOTHROID   75 mcg, Oral, Daily      lisinopril 40 MG tablet  Commonly known as:  PRINIVIL,ZESTRIL   40 mg, Oral, Daily      metoprolol succinate  MG 24 hr tablet  Commonly known as:  TOPROL-XL   100 mg, Oral, Daily      PROBIOTIC-10 PO   1 tablet, Oral, Daily      rosuvastatin 10 MG tablet  Commonly known as:  CRESTOR   10 mg, Oral, Daily      ULTRA COQ10 PO   1 tablet, Oral, Daily             Discharge Diet:   Diet Instructions     Regular Diet                Activity at Discharge:   Activity Instructions     Advance activity as tolerated   Weight bearing as tolerated on left lower extremity  Waterproof dressing in place may shower                Follow-up Appointments  Dr. Jc per his orders.    Discussed with patient discharge plan, expressed understanding and agreement.     Mikaela Brennan MD  03/12/20  11:53

## 2020-03-12 NOTE — THERAPY DISCHARGE NOTE
Acute Care - Occupational Therapy Initial Eval/Discharge   [place]     Patient Name: Benito Forman  : 1940  MRN: [id number]  Today's Date: 3/12/2020               Admit Date: 3/11/2020       ICD-10-CM ICD-9-CM   1. Status post total replacement of left hip Z96.642 V43.64   2. Impaired functional mobility, balance, gait, and endurance Z74.09 V49.89   3. Arthritis of left hip M16.12 716.95   4. Impaired mobility and ADLs Z74.09 799.89     Patient Active Problem List   Diagnosis   • Arthritis of left hip   • Status post total replacement of left hip   • Hypertension   • Hypothyroid   • Hyperlipidemia   • Acute blood loss anemia, mild asymptomatic     Past Medical History:   Diagnosis Date   • Arthritis    • Disease of thyroid gland    • Elevated cholesterol    • Hearing aid worn    • Heart attack (CMS/HCC)    • Hypertension    • Irregular heart beat    • Wears dentures    • Wears glasses      Past Surgical History:   Procedure Laterality Date   • CARDIAC CATHETERIZATION     • CHOLECYSTECTOMY     • COLONOSCOPY     • CORONARY ANGIOPLASTY WITH STENT PLACEMENT     • ELBOW FUSION     • PACEMAKER IMPLANTATION     • POLYPECTOMY     • TOTAL HIP ARTHROPLASTY Left 3/11/2020    Procedure: TOTAL HIP ARTHROPLASTY ANTERIOR DIRECT CEMENTED LEFT;  Surgeon: Vishnu Jc MD;  Location: [facility];  Service: Orthopedics;  Laterality: Left;          OT ASSESSMENT FLOWSHEET (last 12 hours)      Occupational Therapy Evaluation     Row Name 20 1048                   OT Evaluation Time/Intention    Subjective Information  no complaints  -AR        Document Type  evaluation;therapy note (daily note);discharge treatment  -AR        Mode of Treatment  occupational therapy  -AR           General Information    Patient Profile Reviewed?  yes  -AR        Prior Level of Function  min assist:;all household mobility;community mobility;gait;transfer;ADL's used SPC, h/o recent fall  -AR        Pertinent History of Current  Functional Problem  POD#1 left GINO via anterior approach  -AR        Existing Precautions/Restrictions  fall;hip, anterior;left h/o recent fall  -AR        Equipment Issued to Patient  gait belt;sock aid;leg  educated pt on use  -AR        Barriers to Rehab  previous functional deficit  -AR           Relationship/Environment    Primary Source of Support/Comfort  spouse;child(josesito)  -AR        Lives With  spouse  -AR        Concerns About Impact on Relationships  Spouse and son can assist if needed  -AR           Resource/Environmental Concerns    Current Living Arrangements  home/apartment/condo  -AR        Resource/Environmental Concerns  home accessibility  -AR        Home Accessibility Concerns  stairs to enter home;bathroom not accessible  -AR           Home Main Entrance    Number of Stairs, Main Entrance  one  -AR        Stair Railings, Main Entrance  none  -AR           Stairs Within Home, Primary    Number of Stairs, Within Home, Primary  none  -AR           Cognitive Assessment/Interventions    Additional Documentation  Cognitive Assessment/Intervention (Group)  -AR           Cognitive Assessment/Intervention- PT/OT    Affect/Mental Status (Cognitive)  WNL  -AR        Orientation Status (Cognition)  oriented x 4  -AR        Follows Commands (Cognition)  WNL  -AR        Cognitive Function (Cognitive)  WNL  -AR           Safety Issues, Functional Mobility    Impairments Affecting Function (Mobility)  pain;range of motion (ROM)  -AR           Mobility Assessment/Treatment    Extremity Weight-bearing Status  left lower extremity  -AR        Left Lower Extremity (Weight-bearing Status)  weight-bearing as tolerated (WBAT)  -AR           Bed Mobility Assessment/Treatment    Comment (Bed Mobility)  Issued leg  and educated pt on use, pt simulated use of device.  -AR           Functional Mobility    Functional Mobility- Ind. Level  supervision required;verbal cues required  -AR        Functional Mobility-  Device  rolling walker  -AR           Transfer Assessment/Treatment    Transfer Assessment/Treatment  sit-stand transfer;stand-sit transfer;toilet transfer  -AR        Comment (Transfers)  Good safety awareness noted. Reviewed safe car transfer technique using leg  and safe tub transfer technique.   -AR           Sit-Stand Transfer    Sit-Stand [place] (Transfers)  supervision;verbal cues  -AR        Assistive Device (Sit-Stand Transfers)  walker, front-wheeled  -AR           Stand-Sit Transfer    Stand-Sit [place] (Transfers)  supervision;verbal cues  -AR        Assistive Device (Stand-Sit Transfers)  walker, front-wheeled  -AR           Toilet Transfer    Type (Toilet Transfer)  lateral  -AR        Assistive Device (Toilet Transfer)  grab bars/safety frame;raised toilet seat  -AR           ADL Assessment/Intervention    20549 - OT Self Care/Mgmt Minutes  11  -AR        BADL Assessment/Intervention  bathing;upper body dressing;lower body dressing;feeding;toileting  -AR           Bathing Assessment/Intervention    Bathing [place] Level  bathing skills;lower body;supervision simulate  -AR        Bathing Position  supported sitting  -AR           Lower Body Dressing Assessment/Training    Lower Body Dressing [place] Level  doff;don;socks;contact guard assist;pants/bottoms;undergarment;shoes/slippers;verbal cues  -AR        Assistive Devices (Lower Body Dressing)  reacher;sock-aid;one hand technique  -AR        Lower Body Dressing Position  supported sitting;supported standing  -AR        Comment (Lower Body Dressing)  Reviewed ADL retraining including AE use and incorporation of fatoumata-dressing technique. Pt has self-care kit, reviewed use devices. Pt , however, able to don and undergarment with supervision without AE.   -AR           Self-Feeding Assessment/Training    [place] Level (Feeding)  liquids to mouth;supervision  -AR        Position (Self-Feeding)  supported sitting  -AR            Toileting Assessment/Training    [place] Level (Toileting)  toileting skills;supervision  -AR        Assistive Devices (Toileting)  grab bar/safety frame  -AR        Toileting Position  supported standing  -AR           BADL Safety/Performance    Impairments, BADL Safety/Performance  pain;range of motion  -AR        Skilled BADL Treatment/Intervention  adaptive equipment training;compensatory training;fatoumata/one-hand technique  -AR        Progress in BADL Status  improvement noted  -AR           General ROM    GENERAL ROM COMMENTS  WFL BUE  -AR           MMT (Manual Muscle Testing)    General MMT Comments  WFL BUE  -AR           Motor Assessment/Interventions    Additional Documentation  Balance (Group);Balance Interventions (Group)  -AR           Balance    Balance  static sitting balance;static standing balance  -AR           Static Sitting Balance    Level of [place] (Unsupported Sitting, Static Balance)  independent  -AR        Sitting Position (Unsupported Sitting, Static Balance)  sitting in chair  -AR        Time Able to Maintain Position (Unsupported Sitting, Static Balance)  more than 5 minutes  -AR           Static Standing Balance    Level of [place] (Supported Standing, Static Balance)  supervision  -AR        Time Able to Maintain Position (Supported Standing, Static Balance)  1 to 2 minutes  -AR        Assistive Device Utilized (Supported Standing, Static Balance)  walker, rolling  -AR           Sensory Assessment/Intervention    Sensory General Assessment  no sensation deficits identified BUE  -AR           Positioning and Restraints    Pre-Treatment Position  sitting in chair/recliner  -AR        Post Treatment Position  chair  -AR        In Chair  reclined;call light within reach;encouraged to call for assist;exit alarm on;legs elevated  -AR           Pain Scale: Numbers Pre/Post-Treatment    Pain Scale: Numbers, Pretreatment  0/10 - no pain  -AR        Pain Scale: Numbers,  Post-Treatment  2/10  -AR        Pain Location - Side  Left  -AR        Pain Location - Orientation  incisional  -AR        Pain Location  hip  -AR        Pain Intervention(s)  Cold applied;Repositioned;Ambulation/increased activity  -AR           Wound 03/11/20 Left anterior hip Incision    Wound - Properties Group Date first assessed: 03/11/20  -SS Side: Left  -SS Orientation: anterior  -SS Location: hip  -SS Primary Wound Type: Incision  -SS Additional Comments: Skin affix, steris, optifoam   -SS       Plan of Care Review    Plan of Care Reviewed With  patient  -AR        Progress  improving  -AR           Clinical Impression (OT)    Therapy Frequency (OT Eval)  evaluation only  -AR        Anticipated Equipment Needs at Discharge (OT)  front wheeled walker  -AR        Anticipated Discharge Disposition (OT)  home with assist  -AR           OT Goals    Transfer Goal Selection (OT)  transfer, OT goal 1  -AR        Dressing Goal Selection (OT)  dressing, OT goal 1  -AR        Toileting Goal Selection (OT)  toileting, OT goal 1  -AR           Transfer Goal 1 (OT)    Activity/Assistive Device (Transfer Goal 1, OT)  sit-to-stand/stand-to-sit;toilet  -AR        [place] Level/Cues Needed (Transfer Goal 1, OT)  verbal cues required;contact guard assist  -AR        Time Frame (Transfer Goal 1, OT)  short term goal (STG);1 day  -AR        Progress/Outcome (Transfer Goal 1, OT)  goal met  -AR           Dressing Goal 1 (OT)    Activity/Assistive Device (Dressing Goal 1, OT)  lower body dressing;reacher;sock-aid AD PRN  -AR        [place]/Cues Needed (Dressing Goal 1, OT)  verbal cues required;contact guard assist  -AR        Time Frame (Dressing Goal 1, OT)  short term goal (STG);1 day  -AR        Progress/Outcome (Dressing Goal 1, OT)  goal met  -AR           Toileting Goal 1 (OT)    Activity/Device (Toileting Goal 1, OT)  toileting skills, all  -AR        [place] Level/Cues Needed (Toileting Goal 1, OT)   verbal cues required;contact guard assist  -AR        Time Frame (Toileting Goal 1, OT)  short term goal (STG);1 day  -AR        Progress/Outcome (Toileting Goal 1, OT)  goal met  -AR           Discharge Summary (Occupational Therapy)    Additional Documentation  Discharge Summary, OT Eval (Group)  -AR           Discharge Summary, OT Eval    Reason for Discharge (OT Discharge Summary)  patient discharged from this facility  -AR        Outcomes Achieved Upon Discharge (OT Discharge Summary)  able to achieve all goals within established timeline  -AR          User Key  (r) = Recorded By, (t) = Taken By, (c) = Cosigned By    Initials Name Effective Dates    AR Nella Alatorre, OT 06/22/15 -     SS Jesús Morales RN 01/15/18 -               OT Recommendation and Plan  Outcome Summary/Treatment Plan (OT)  Anticipated Equipment Needs at Discharge (OT): front wheeled walker  Anticipated Discharge Disposition (OT): home with assist  Reason for Discharge (OT Discharge Summary): patient discharged from this facility  Therapy Frequency (OT Eval): evaluation only  Plan of Care Review  Plan of Care Reviewed With: patient  Plan of Care Reviewed With: patient  Outcome Summary: OT educated pt on ADL retraining and transfer training. Pt has self-care kit, OT reviewed correct use of equipment. He completed LB dressing with CGA/supervision and transfer training with supervision. Post pain 2/10 left incision hip. Recommend DC home with family assist and RW.     Rehab Goal Summary     Row Name 03/12/20 1048             Occupational Therapy Goals    Transfer Goal Selection (OT)  transfer, OT goal 1  -AR      Dressing Goal Selection (OT)  dressing, OT goal 1  -AR      Toileting Goal Selection (OT)  toileting, OT goal 1  -AR         Transfer Goal 1 (OT)    Activity/Assistive Device (Transfer Goal 1, OT)  sit-to-stand/stand-to-sit;toilet  -AR      [place] Level/Cues Needed (Transfer Goal 1, OT)  verbal cues required;contact  guard assist  -AR      Time Frame (Transfer Goal 1, OT)  short term goal (STG);1 day  -AR      Progress/Outcome (Transfer Goal 1, OT)  goal met  -AR         Dressing Goal 1 (OT)    Activity/Assistive Device (Dressing Goal 1, OT)  lower body dressing;reacher;sock-aid AD PRN  -AR      [place]/Cues Needed (Dressing Goal 1, OT)  verbal cues required;contact guard assist  -AR      Time Frame (Dressing Goal 1, OT)  short term goal (STG);1 day  -AR      Progress/Outcome (Dressing Goal 1, OT)  goal met  -AR         Toileting Goal 1 (OT)    Activity/Device (Toileting Goal 1, OT)  toileting skills, all  -AR      [place] Level/Cues Needed (Toileting Goal 1, OT)  verbal cues required;contact guard assist  -AR      Time Frame (Toileting Goal 1, OT)  short term goal (STG);1 day  -AR      Progress/Outcome (Toileting Goal 1, OT)  goal met  -AR        User Key  (r) = Recorded By, (t) = Taken By, (c) = Cosigned By    Initials Name Provider Type Discipline    Nella Luna, OT Occupational Therapist OT          Outcome Measures     Row Name 03/12/20 1048             How much help from another is currently needed...    Putting on and taking off regular lower body clothing?  3  -AR      Bathing (including washing, rinsing, and drying)  3  -AR      Toileting (which includes using toilet bed pan or urinal)  3  -AR      Putting on and taking off regular upper body clothing  4  -AR      Taking care of personal grooming (such as brushing teeth)  4  -AR      Eating meals  4  -AR      AM-PAC 6 Clicks Score (OT)  21  -AR         Functional Assessment    Outcome Measure Options  AM-PAC 6 Clicks Daily Activity (OT)  -AR        User Key  (r) = Recorded By, (t) = Taken By, (c) = Cosigned By    Initials Name Provider Type    Nella Luna OT Occupational Therapist          Time Calculation:   Time Calculation- OT     Row Name 03/12/20 1048 03/12/20 1008          Time Calculation- OT    OT Start Time  1048  -AR  --      Total Timed Code Minutes- OT  11 minute(s)  -AR  --     OT Received On  03/12/20  -AR  --     OT Goal Re-Cert Due Date  03/12/20  -AR  --        Timed Charges    69997 - Gait Training Minutes   --  19  -EJ     72018 - OT Self Care/Mgmt Minutes  11  -AR  --       User Key  (r) = Recorded By, (t) = Taken By, (c) = Cosigned By    Initials Name Provider Type    Ligia Calhoun PT Physical Therapist    Nella Luna OT Occupational Therapist        Therapy Suggested Charges     Code   Minutes Charges    15753 (CPT®) Hc Ot Neuromusc Re Education Ea 15 Min      86724 (CPT®) Hc Ot Ther Proc Ea 15 Min      03840 (CPT®) Hc Ot Therapeutic Act Ea 15 Min      69363 (CPT®) Hc Ot Manual Therapy Ea 15 Min      64448 (CPT®) Hc Ot Iontophoresis Ea 15 Min      67753 (CPT®) Hc Ot Elec Stim Ea-Per 15 Min      08668 (CPT®) Hc Ot Ultrasound Ea 15 Min      30675 (CPT®) Hc Ot Self Care/Mgmt/Train Ea 15 Min 11 1    Total  11 1        Therapy Charges for Today     Code Description Service Date Service Provider Modifiers Qty    94553192428 HC OT SELF CARE/MGMT/TRAIN EA 15 MIN 3/12/2020 Nella Alatorre OT GO 1    16633222185 HC OT EVAL LOW COMPLEXITY 4 3/12/2020 Nella Alatorre OT GO 1               OT Discharge Summary  Anticipated Discharge Disposition (OT): home with assist  Reason for Discharge: Discharge from facility  Outcomes Achieved: Able to achieve all goals within established timeline  Discharge Destination: Home with home health    Nella Alatorre OT  3/12/2020

## 2020-03-31 DIAGNOSIS — Z96.642 STATUS POST TOTAL REPLACEMENT OF LEFT HIP: Primary | ICD-10-CM

## 2020-03-31 RX ORDER — GABAPENTIN 300 MG/1
300 CAPSULE ORAL
Qty: 30 CAPSULE | Refills: 0 | Status: SHIPPED | OUTPATIENT
Start: 2020-03-31 | End: 2021-08-31

## 2020-04-20 DIAGNOSIS — Z96.642 STATUS POST TOTAL REPLACEMENT OF LEFT HIP: Primary | ICD-10-CM

## 2020-04-20 RX ORDER — GABAPENTIN 300 MG/1
300 CAPSULE ORAL
Qty: 60 CAPSULE | Refills: 0 | Status: SHIPPED | OUTPATIENT
Start: 2020-04-20 | End: 2021-08-31

## 2021-08-31 ENCOUNTER — ANESTHESIA EVENT (OUTPATIENT)
Dept: PERIOP | Facility: HOSPITAL | Age: 81
End: 2021-08-31

## 2021-08-31 ENCOUNTER — PREP FOR SURGERY (OUTPATIENT)
Dept: CARDIOLOGY | Facility: CLINIC | Age: 81
End: 2021-08-31

## 2021-08-31 ENCOUNTER — PRE-ADMISSION TESTING (OUTPATIENT)
Dept: PREADMISSION TESTING | Facility: HOSPITAL | Age: 81
End: 2021-08-31

## 2021-08-31 ENCOUNTER — HOSPITAL ENCOUNTER (OUTPATIENT)
Dept: GENERAL RADIOLOGY | Facility: HOSPITAL | Age: 81
Discharge: HOME | End: 2021-08-31

## 2021-08-31 ENCOUNTER — HOSPITAL ENCOUNTER (OUTPATIENT)
Dept: CARDIOLOGY | Facility: HOSPITAL | Age: 81
Discharge: HOME | End: 2021-08-31

## 2021-08-31 VITALS — BODY MASS INDEX: 25.06 KG/M2 | HEIGHT: 72 IN | WEIGHT: 185 LBS

## 2021-08-31 VITALS — HEIGHT: 72 IN | WEIGHT: 185 LBS | BODY MASS INDEX: 25.06 KG/M2

## 2021-08-31 LAB
ABO GROUP BLD: NORMAL
ALBUMIN SERPL-MCNC: 4.4 G/DL (ref 3.5–5.2)
ALBUMIN/GLOB SERPL: 1.9 G/DL
ALP SERPL-CCNC: 108 U/L (ref 39–117)
ALT SERPL W P-5'-P-CCNC: 42 U/L (ref 1–41)
ANION GAP SERPL CALCULATED.3IONS-SCNC: 13 MMOL/L (ref 5–15)
APTT PPP: 28.4 SECONDS (ref 22–39)
AST SERPL-CCNC: 36 U/L (ref 1–40)
BASOPHILS # BLD AUTO: 0.04 10*3/MM3 (ref 0–0.2)
BASOPHILS NFR BLD AUTO: 0.4 % (ref 0–1.5)
BH CV ECHO MEAS - AO ROOT AREA (BSA CORRECTED): 1.8
BH CV ECHO MEAS - AO ROOT AREA: 11.3 CM^2
BH CV ECHO MEAS - AO ROOT DIAM: 3.8 CM
BH CV ECHO MEAS - BSA(HAYCOCK): 2.1 M^2
BH CV ECHO MEAS - BSA: 2.1 M^2
BH CV ECHO MEAS - BZI_BMI: 25.1 KILOGRAMS/M^2
BH CV ECHO MEAS - BZI_METRIC_HEIGHT: 182.9 CM
BH CV ECHO MEAS - BZI_METRIC_WEIGHT: 83.9 KG
BH CV ECHO MEAS - EDV(CUBED): 121.3 ML
BH CV ECHO MEAS - EDV(MOD-SP2): 82 ML
BH CV ECHO MEAS - EDV(MOD-SP4): 165 ML
BH CV ECHO MEAS - EDV(TEICH): 115.5 ML
BH CV ECHO MEAS - EF(CUBED): 64 %
BH CV ECHO MEAS - EF(MOD-BP): 54.5 %
BH CV ECHO MEAS - EF(MOD-SP2): 48.8 %
BH CV ECHO MEAS - EF(MOD-SP4): 60 %
BH CV ECHO MEAS - EF(TEICH): 55.4 %
BH CV ECHO MEAS - ESV(CUBED): 43.6 ML
BH CV ECHO MEAS - ESV(MOD-SP2): 42 ML
BH CV ECHO MEAS - ESV(MOD-SP4): 66 ML
BH CV ECHO MEAS - ESV(TEICH): 51.6 ML
BH CV ECHO MEAS - FS: 28.9 %
BH CV ECHO MEAS - IVS/LVPW: 0.98
BH CV ECHO MEAS - IVSD: 1.2 CM
BH CV ECHO MEAS - LA DIMENSION: 3.1 CM
BH CV ECHO MEAS - LA/AO: 0.82
BH CV ECHO MEAS - LAD MAJOR: 5 CM
BH CV ECHO MEAS - LAT PEAK E' VEL: 5.6 CM/SEC
BH CV ECHO MEAS - LATERAL E/E' RATIO: 16.5
BH CV ECHO MEAS - LV DIASTOLIC VOL/BSA (35-75): 80.1 ML/M^2
BH CV ECHO MEAS - LV MASS(C)D: 222 GRAMS
BH CV ECHO MEAS - LV MASS(C)DI: 107.7 GRAMS/M^2
BH CV ECHO MEAS - LV MAX PG: 2.4 MMHG
BH CV ECHO MEAS - LV MEAN PG: 1 MMHG
BH CV ECHO MEAS - LV SYSTOLIC VOL/BSA (12-30): 32 ML/M^2
BH CV ECHO MEAS - LV V1 MAX: 78.2 CM/SEC
BH CV ECHO MEAS - LV V1 MEAN: 48.7 CM/SEC
BH CV ECHO MEAS - LV V1 VTI: 17 CM
BH CV ECHO MEAS - LVIDD: 5 CM
BH CV ECHO MEAS - LVIDS: 3.5 CM
BH CV ECHO MEAS - LVLD AP2: 7.8 CM
BH CV ECHO MEAS - LVLD AP4: 9.1 CM
BH CV ECHO MEAS - LVLS AP2: 6.8 CM
BH CV ECHO MEAS - LVLS AP4: 9.2 CM
BH CV ECHO MEAS - LVOT AREA (M): 3.1 CM^2
BH CV ECHO MEAS - LVOT AREA: 3.1 CM^2
BH CV ECHO MEAS - LVOT DIAM: 2 CM
BH CV ECHO MEAS - LVPWD: 1.2 CM
BH CV ECHO MEAS - MED PEAK E' VEL: 15.8 CM/SEC
BH CV ECHO MEAS - MEDIAL E/E' RATIO: 5.8
BH CV ECHO MEAS - MV A MAX VEL: 97.7 CM/SEC
BH CV ECHO MEAS - MV DEC SLOPE: 697 CM/SEC^2
BH CV ECHO MEAS - MV E MAX VEL: 92.3 CM/SEC
BH CV ECHO MEAS - MV E/A: 0.94
BH CV ECHO MEAS - MV MAX PG: 5.5 MMHG
BH CV ECHO MEAS - MV MEAN PG: 3 MMHG
BH CV ECHO MEAS - MV P1/2T MAX VEL: 115 CM/SEC
BH CV ECHO MEAS - MV P1/2T: 48.3 MSEC
BH CV ECHO MEAS - MV V2 MAX: 117 CM/SEC
BH CV ECHO MEAS - MV V2 MEAN: 75.1 CM/SEC
BH CV ECHO MEAS - MV V2 VTI: 30.1 CM
BH CV ECHO MEAS - MVA P1/2T LCG: 1.9 CM^2
BH CV ECHO MEAS - MVA(P1/2T): 4.6 CM^2
BH CV ECHO MEAS - MVA(VTI): 1.8 CM^2
BH CV ECHO MEAS - PA ACC SLOPE: 506 CM/SEC^2
BH CV ECHO MEAS - PA ACC TIME: 0.14 SEC
BH CV ECHO MEAS - PA PR(ACCEL): 14.2 MMHG
BH CV ECHO MEAS - RAP SYSTOLE: 8 MMHG
BH CV ECHO MEAS - RVSP: 31 MMHG
BH CV ECHO MEAS - SI(CUBED): 37.7 ML/M^2
BH CV ECHO MEAS - SI(LVOT): 25.9 ML/M^2
BH CV ECHO MEAS - SI(MOD-SP2): 19.4 ML/M^2
BH CV ECHO MEAS - SI(MOD-SP4): 48 ML/M^2
BH CV ECHO MEAS - SI(TEICH): 31 ML/M^2
BH CV ECHO MEAS - SV(CUBED): 77.7 ML
BH CV ECHO MEAS - SV(LVOT): 53.4 ML
BH CV ECHO MEAS - SV(MOD-SP2): 40 ML
BH CV ECHO MEAS - SV(MOD-SP4): 99 ML
BH CV ECHO MEAS - SV(TEICH): 63.9 ML
BH CV ECHO MEAS - TAPSE (>1.6): 2.2 CM
BH CV ECHO MEAS - TR MAX PG: 23 MMHG
BH CV ECHO MEAS - TR MAX VEL: 239.3 CM/SEC
BH CV ECHO MEASUREMENTS AVERAGE E/E' RATIO: 8.63
BH CV VAS BP LEFT ARM: NORMAL MMHG
BH CV XLRA - RV BASE: 3.9 CM
BH CV XLRA - RV LENGTH: 6.8 CM
BH CV XLRA - RV MID: 2.6 CM
BH CV XLRA - TDI S': 14.2 CM/SEC
BILIRUB SERPL-MCNC: 0.7 MG/DL (ref 0–1.2)
BLD GP AB SCN SERPL QL: NEGATIVE
BUN SERPL-MCNC: 20 MG/DL (ref 8–23)
BUN/CREAT SERPL: 25 (ref 7–25)
CALCIUM SPEC-SCNC: 9.4 MG/DL (ref 8.6–10.5)
CHLORIDE SERPL-SCNC: 105 MMOL/L (ref 98–107)
CO2 SERPL-SCNC: 26 MMOL/L (ref 22–29)
CREAT SERPL-MCNC: 0.8 MG/DL (ref 0.76–1.27)
CRP SERPL-MCNC: <0.3 MG/DL (ref 0–0.5)
DEPRECATED RDW RBC AUTO: 51.1 FL (ref 37–54)
EOSINOPHIL # BLD AUTO: 0.11 10*3/MM3 (ref 0–0.4)
EOSINOPHIL NFR BLD AUTO: 1.2 % (ref 0.3–6.2)
ERYTHROCYTE [DISTWIDTH] IN BLOOD BY AUTOMATED COUNT: 15.1 % (ref 12.3–15.4)
ERYTHROCYTE [SEDIMENTATION RATE] IN BLOOD: 12 MM/HR (ref 0–20)
FLUAV RNA RESP QL NAA+PROBE: NOT DETECTED
FLUBV RNA RESP QL NAA+PROBE: NOT DETECTED
GFR SERPL CREATININE-BSD FRML MDRD: 93 ML/MIN/1.73
GLOBULIN UR ELPH-MCNC: 2.3 GM/DL
GLUCOSE SERPL-MCNC: 88 MG/DL (ref 65–99)
HBA1C MFR BLD: 5.7 % (ref 4.8–5.6)
HCT VFR BLD AUTO: 43.5 % (ref 37.5–51)
HGB BLD-MCNC: 14.3 G/DL (ref 13–17.7)
IMM GRANULOCYTES # BLD AUTO: 0.03 10*3/MM3 (ref 0–0.05)
IMM GRANULOCYTES NFR BLD AUTO: 0.3 % (ref 0–0.5)
INR PPP: 1 (ref 0.85–1.16)
LEFT ATRIUM VOLUME INDEX: 20.4 ML/M^2
LEFT ATRIUM VOLUME: 42 ML
LV EF 2D ECHO EST: 55 %
LYMPHOCYTES # BLD AUTO: 1.88 10*3/MM3 (ref 0.7–3.1)
LYMPHOCYTES NFR BLD AUTO: 20.3 % (ref 19.6–45.3)
MAXIMAL PREDICTED HEART RATE: 138 BPM
MCH RBC QN AUTO: 30.4 PG (ref 26.6–33)
MCHC RBC AUTO-ENTMCNC: 32.9 G/DL (ref 31.5–35.7)
MCV RBC AUTO: 92.4 FL (ref 79–97)
MONOCYTES # BLD AUTO: 1.06 10*3/MM3 (ref 0.1–0.9)
MONOCYTES NFR BLD AUTO: 11.4 % (ref 5–12)
MRSA DNA SPEC QL NAA+PROBE: NEGATIVE
NEUTROPHILS NFR BLD AUTO: 6.16 10*3/MM3 (ref 1.7–7)
NEUTROPHILS NFR BLD AUTO: 66.4 % (ref 42.7–76)
NRBC BLD AUTO-RTO: 0 /100 WBC (ref 0–0.2)
PLATELET # BLD AUTO: 295 10*3/MM3 (ref 140–450)
PMV BLD AUTO: 9.6 FL (ref 6–12)
POTASSIUM SERPL-SCNC: 3.3 MMOL/L (ref 3.5–5.2)
PROT SERPL-MCNC: 6.7 G/DL (ref 6–8.5)
PROTHROMBIN TIME: 12.9 SECONDS (ref 11.4–14.4)
QT INTERVAL: 380 MS
QTC INTERVAL: 454 MS
RBC # BLD AUTO: 4.71 10*6/MM3 (ref 4.14–5.8)
RH BLD: POSITIVE
SARS-COV-2 RNA RESP QL NAA+PROBE: NOT DETECTED
SODIUM SERPL-SCNC: 144 MMOL/L (ref 136–145)
STRESS TARGET HR: 117 BPM
T&S EXPIRATION DATE: NORMAL
WBC # BLD AUTO: 9.28 10*3/MM3 (ref 3.4–10.8)

## 2021-08-31 PROCEDURE — 71046 X-RAY EXAM CHEST 2 VIEWS: CPT

## 2021-08-31 PROCEDURE — 36415 COLL VENOUS BLD VENIPUNCTURE: CPT

## 2021-08-31 PROCEDURE — 80053 COMPREHEN METABOLIC PANEL: CPT

## 2021-08-31 PROCEDURE — 86900 BLOOD TYPING SEROLOGIC ABO: CPT

## 2021-08-31 PROCEDURE — 93306 TTE W/DOPPLER COMPLETE: CPT

## 2021-08-31 PROCEDURE — 85610 PROTHROMBIN TIME: CPT

## 2021-08-31 PROCEDURE — 93005 ELECTROCARDIOGRAM TRACING: CPT

## 2021-08-31 PROCEDURE — 87641 MR-STAPH DNA AMP PROBE: CPT

## 2021-08-31 PROCEDURE — 93306 TTE W/DOPPLER COMPLETE: CPT | Performed by: INTERNAL MEDICINE

## 2021-08-31 PROCEDURE — 86901 BLOOD TYPING SEROLOGIC RH(D): CPT

## 2021-08-31 PROCEDURE — 93010 ELECTROCARDIOGRAM REPORT: CPT | Performed by: INTERNAL MEDICINE

## 2021-08-31 PROCEDURE — 85652 RBC SED RATE AUTOMATED: CPT

## 2021-08-31 PROCEDURE — 85025 COMPLETE CBC W/AUTO DIFF WBC: CPT

## 2021-08-31 PROCEDURE — 87636 SARSCOV2 & INF A&B AMP PRB: CPT

## 2021-08-31 PROCEDURE — C9803 HOPD COVID-19 SPEC COLLECT: HCPCS

## 2021-08-31 PROCEDURE — 86850 RBC ANTIBODY SCREEN: CPT

## 2021-08-31 PROCEDURE — 85730 THROMBOPLASTIN TIME PARTIAL: CPT

## 2021-08-31 PROCEDURE — 86140 C-REACTIVE PROTEIN: CPT

## 2021-08-31 PROCEDURE — 83036 HEMOGLOBIN GLYCOSYLATED A1C: CPT

## 2021-08-31 ASSESSMENT — HOOS JR
HOOS JR SCORE: 21
HOOS JR SCORE: 20.805

## 2021-09-01 ENCOUNTER — HOSPITAL ENCOUNTER (INPATIENT)
Facility: HOSPITAL | Age: 81
LOS: 1 days | Discharge: HOME | End: 2021-09-02
Attending: ORTHOPAEDIC SURGERY | Admitting: ORTHOPAEDIC SURGERY

## 2021-09-01 ENCOUNTER — APPOINTMENT (OUTPATIENT)
Dept: GENERAL RADIOLOGY | Facility: HOSPITAL | Age: 81
End: 2021-09-01

## 2021-09-01 ENCOUNTER — ANESTHESIA (OUTPATIENT)
Dept: PERIOP | Facility: HOSPITAL | Age: 81
End: 2021-09-01

## 2021-09-01 DIAGNOSIS — M25.551 RIGHT HIP PAIN: ICD-10-CM

## 2021-09-01 DIAGNOSIS — Z96.641 STATUS POST TOTAL REPLACEMENT OF RIGHT HIP: Primary | ICD-10-CM

## 2021-09-01 LAB
ABO GROUP BLD: NORMAL
GLUCOSE BLDC GLUCOMTR-MCNC: 82 MG/DL (ref 70–130)
RH BLD: POSITIVE

## 2021-09-01 PROCEDURE — 82962 GLUCOSE BLOOD TEST: CPT

## 2021-09-01 PROCEDURE — 87070 CULTURE OTHR SPECIMN AEROBIC: CPT | Performed by: ORTHOPAEDIC SURGERY

## 2021-09-01 PROCEDURE — 25010000003 EPINEPHRINE 30 MG/30ML SOLUTION: Performed by: ORTHOPAEDIC SURGERY

## 2021-09-01 PROCEDURE — C1713 ANCHOR/SCREW BN/BN,TIS/BN: HCPCS | Performed by: ORTHOPAEDIC SURGERY

## 2021-09-01 PROCEDURE — 87075 CULTR BACTERIA EXCEPT BLOOD: CPT | Performed by: ORTHOPAEDIC SURGERY

## 2021-09-01 PROCEDURE — 87206 SMEAR FLUORESCENT/ACID STAI: CPT | Performed by: ORTHOPAEDIC SURGERY

## 2021-09-01 PROCEDURE — 73502 X-RAY EXAM HIP UNI 2-3 VIEWS: CPT

## 2021-09-01 PROCEDURE — 25010000002 KETOROLAC TROMETHAMINE PER 15 MG: Performed by: ORTHOPAEDIC SURGERY

## 2021-09-01 PROCEDURE — 87102 FUNGUS ISOLATION CULTURE: CPT | Performed by: ORTHOPAEDIC SURGERY

## 2021-09-01 PROCEDURE — 0SR9049 REPLACEMENT OF RIGHT HIP JOINT WITH CERAMIC ON POLYETHYLENE SYNTHETIC SUBSTITUTE, CEMENTED, OPEN APPROACH: ICD-10-PCS | Performed by: ORTHOPAEDIC SURGERY

## 2021-09-01 PROCEDURE — 25010000003 CEFAZOLIN IN DEXTROSE 2-4 GM/100ML-% SOLUTION: Performed by: ORTHOPAEDIC SURGERY

## 2021-09-01 PROCEDURE — 97116 GAIT TRAINING THERAPY: CPT

## 2021-09-01 PROCEDURE — 25010000002 DEXAMETHASONE PER 1 MG: Performed by: NURSE ANESTHETIST, CERTIFIED REGISTERED

## 2021-09-01 PROCEDURE — C1776 JOINT DEVICE (IMPLANTABLE): HCPCS | Performed by: ORTHOPAEDIC SURGERY

## 2021-09-01 PROCEDURE — 25010000002 PROPOFOL 10 MG/ML EMULSION: Performed by: NURSE ANESTHETIST, CERTIFIED REGISTERED

## 2021-09-01 PROCEDURE — 87205 SMEAR GRAM STAIN: CPT | Performed by: ORTHOPAEDIC SURGERY

## 2021-09-01 PROCEDURE — 87176 TISSUE HOMOGENIZATION CULTR: CPT | Performed by: ORTHOPAEDIC SURGERY

## 2021-09-01 PROCEDURE — 87116 MYCOBACTERIA CULTURE: CPT | Performed by: ORTHOPAEDIC SURGERY

## 2021-09-01 PROCEDURE — 76000 FLUOROSCOPY <1 HR PHYS/QHP: CPT

## 2021-09-01 PROCEDURE — 86901 BLOOD TYPING SEROLOGIC RH(D): CPT

## 2021-09-01 PROCEDURE — C1889 IMPLANT/INSERT DEVICE, NOC: HCPCS | Performed by: ORTHOPAEDIC SURGERY

## 2021-09-01 PROCEDURE — C1755 CATHETER, INTRASPINAL: HCPCS | Performed by: ORTHOPAEDIC SURGERY

## 2021-09-01 PROCEDURE — 86900 BLOOD TYPING SEROLOGIC ABO: CPT

## 2021-09-01 PROCEDURE — 25010000002 CLONIDINE PER 1 MG: Performed by: ORTHOPAEDIC SURGERY

## 2021-09-01 PROCEDURE — 25010000002 ONDANSETRON PER 1 MG: Performed by: NURSE ANESTHETIST, CERTIFIED REGISTERED

## 2021-09-01 PROCEDURE — 97161 PT EVAL LOW COMPLEX 20 MIN: CPT

## 2021-09-01 DEVICE — IMPLANTABLE DEVICE: Type: IMPLANTABLE DEVICE | Site: HIP | Status: FUNCTIONAL

## 2021-09-01 DEVICE — SHLL ACET TRIDENT2 TRITANIUM C/HL 60MM: Type: IMPLANTABLE DEVICE | Site: HIP | Status: FUNCTIONAL

## 2021-09-01 DEVICE — DEV CONTRL TISS STRATAFIX SYMM PDS PLUS VIL CT-1 45CM: Type: IMPLANTABLE DEVICE | Site: HIP | Status: FUNCTIONAL

## 2021-09-01 DEVICE — SUT NONABS MAXBRAID/PE NMBR2 HC5 38IN WHT 900333: Type: IMPLANTABLE DEVICE | Site: HIP | Status: FUNCTIONAL

## 2021-09-01 DEVICE — CAP TOTL HIP MOBL BEAR 5 PC: Type: IMPLANTABLE DEVICE | Site: HIP | Status: FUNCTIONAL

## 2021-09-01 DEVICE — BONE PREPARATION KIT
Type: IMPLANTABLE DEVICE | Site: HIP | Status: FUNCTIONAL
Brand: BIOPREP

## 2021-09-01 DEVICE — CMT BONE SIMPLEX/P TMYCIN FDOS 10PK: Type: IMPLANTABLE DEVICE | Site: HIP | Status: FUNCTIONAL

## 2021-09-01 DEVICE — INSRT HIP RESTOR ADM X3 28X54MM: Type: IMPLANTABLE DEVICE | Site: HIP | Status: FUNCTIONAL

## 2021-09-01 DEVICE — HD FEM/HIP BIOLOX/DELTA V40 CERAM 28MM MIN2.7: Type: IMPLANTABLE DEVICE | Site: HIP | Status: FUNCTIONAL

## 2021-09-01 DEVICE — LINER MDM CMTLS COCR SZG 48MM: Type: IMPLANTABLE DEVICE | Site: HIP | Status: FUNCTIONAL

## 2021-09-01 DEVICE — SCRW HEX LP TRIDENT2 6.5X40MM: Type: IMPLANTABLE DEVICE | Site: HIP | Status: FUNCTIONAL

## 2021-09-01 RX ORDER — DILTIAZEM HYDROCHLORIDE 180 MG/1
180 CAPSULE, COATED, EXTENDED RELEASE ORAL DAILY
Status: DISCONTINUED | OUTPATIENT
Start: 2021-09-02 | End: 2021-09-02 | Stop reason: HOSPADM

## 2021-09-01 RX ORDER — ONDANSETRON 2 MG/ML
INJECTION INTRAMUSCULAR; INTRAVENOUS AS NEEDED
Status: DISCONTINUED | OUTPATIENT
Start: 2021-09-01 | End: 2021-09-01 | Stop reason: SURG

## 2021-09-01 RX ORDER — ASPIRIN 81 MG/1
81 TABLET ORAL DAILY
Status: DISCONTINUED | OUTPATIENT
Start: 2021-09-02 | End: 2021-09-02 | Stop reason: HOSPADM

## 2021-09-01 RX ORDER — EPHEDRINE SULFATE 50 MG/ML
INJECTION, SOLUTION INTRAVENOUS AS NEEDED
Status: DISCONTINUED | OUTPATIENT
Start: 2021-09-01 | End: 2021-09-01 | Stop reason: SURG

## 2021-09-01 RX ORDER — ACETAMINOPHEN 500 MG
1000 TABLET ORAL EVERY 8 HOURS SCHEDULED
Status: DISCONTINUED | OUTPATIENT
Start: 2021-09-01 | End: 2021-09-02 | Stop reason: HOSPADM

## 2021-09-01 RX ORDER — TRANEXAMIC ACID 10 MG/ML
1000 INJECTION, SOLUTION INTRAVENOUS ONCE
Status: COMPLETED | OUTPATIENT
Start: 2021-09-01 | End: 2021-09-01

## 2021-09-01 RX ORDER — CEFAZOLIN SODIUM 2 G/100ML
2 INJECTION, SOLUTION INTRAVENOUS EVERY 8 HOURS
Status: COMPLETED | OUTPATIENT
Start: 2021-09-01 | End: 2021-09-02

## 2021-09-01 RX ORDER — ACETAMINOPHEN 160 MG
TABLET,DISINTEGRATING ORAL AS NEEDED
Status: DISCONTINUED | OUTPATIENT
Start: 2021-09-01 | End: 2021-09-01 | Stop reason: HOSPADM

## 2021-09-01 RX ORDER — MAGNESIUM HYDROXIDE 1200 MG/15ML
LIQUID ORAL AS NEEDED
Status: DISCONTINUED | OUTPATIENT
Start: 2021-09-01 | End: 2021-09-01 | Stop reason: HOSPADM

## 2021-09-01 RX ORDER — SODIUM CHLORIDE 0.9 % (FLUSH) 0.9 %
10 SYRINGE (ML) INJECTION AS NEEDED
Status: DISCONTINUED | OUTPATIENT
Start: 2021-09-01 | End: 2021-09-01 | Stop reason: HOSPADM

## 2021-09-01 RX ORDER — ROSUVASTATIN CALCIUM 10 MG/1
10 TABLET, COATED ORAL DAILY
Status: DISCONTINUED | OUTPATIENT
Start: 2021-09-02 | End: 2021-09-02 | Stop reason: HOSPADM

## 2021-09-01 RX ORDER — OXYCODONE HYDROCHLORIDE 5 MG/1
10 TABLET ORAL EVERY 4 HOURS PRN
Status: DISCONTINUED | OUTPATIENT
Start: 2021-09-01 | End: 2021-09-02 | Stop reason: HOSPADM

## 2021-09-01 RX ORDER — LISINOPRIL 40 MG/1
40 TABLET ORAL DAILY
Status: DISCONTINUED | OUTPATIENT
Start: 2021-09-01 | End: 2021-09-02 | Stop reason: HOSPADM

## 2021-09-01 RX ORDER — LEVOTHYROXINE SODIUM 0.07 MG/1
75 TABLET ORAL
Status: DISCONTINUED | OUTPATIENT
Start: 2021-09-02 | End: 2021-09-02 | Stop reason: HOSPADM

## 2021-09-01 RX ORDER — FAMOTIDINE 20 MG/1
20 TABLET, FILM COATED ORAL ONCE
Status: COMPLETED | OUTPATIENT
Start: 2021-09-01 | End: 2021-09-01

## 2021-09-01 RX ORDER — OXYCODONE HYDROCHLORIDE 5 MG/1
5 TABLET ORAL EVERY 4 HOURS PRN
Status: DISCONTINUED | OUTPATIENT
Start: 2021-09-01 | End: 2021-09-02 | Stop reason: HOSPADM

## 2021-09-01 RX ORDER — IPRATROPIUM BROMIDE AND ALBUTEROL SULFATE 2.5; .5 MG/3ML; MG/3ML
3 SOLUTION RESPIRATORY (INHALATION) ONCE AS NEEDED
Status: DISCONTINUED | OUTPATIENT
Start: 2021-09-01 | End: 2021-09-01 | Stop reason: HOSPADM

## 2021-09-01 RX ORDER — DEXAMETHASONE SODIUM PHOSPHATE 4 MG/ML
INJECTION, SOLUTION INTRA-ARTICULAR; INTRALESIONAL; INTRAMUSCULAR; INTRAVENOUS; SOFT TISSUE AS NEEDED
Status: DISCONTINUED | OUTPATIENT
Start: 2021-09-01 | End: 2021-09-01 | Stop reason: SURG

## 2021-09-01 RX ORDER — MIDAZOLAM HYDROCHLORIDE 1 MG/ML
0.5 INJECTION INTRAMUSCULAR; INTRAVENOUS
Status: DISCONTINUED | OUTPATIENT
Start: 2021-09-01 | End: 2021-09-01 | Stop reason: HOSPADM

## 2021-09-01 RX ORDER — ACETAMINOPHEN 500 MG
1000 TABLET ORAL ONCE
Status: COMPLETED | OUTPATIENT
Start: 2021-09-01 | End: 2021-09-01

## 2021-09-01 RX ORDER — LABETALOL HYDROCHLORIDE 5 MG/ML
10 INJECTION, SOLUTION INTRAVENOUS EVERY 4 HOURS PRN
Status: DISCONTINUED | OUTPATIENT
Start: 2021-09-01 | End: 2021-09-02 | Stop reason: HOSPADM

## 2021-09-01 RX ORDER — FAMOTIDINE 10 MG/ML
20 INJECTION, SOLUTION INTRAVENOUS ONCE
Status: DISCONTINUED | OUTPATIENT
Start: 2021-09-01 | End: 2021-09-01 | Stop reason: HOSPADM

## 2021-09-01 RX ORDER — CEFAZOLIN SODIUM 2 G/100ML
2 INJECTION, SOLUTION INTRAVENOUS ONCE
Status: COMPLETED | OUTPATIENT
Start: 2021-09-01 | End: 2021-09-01

## 2021-09-01 RX ORDER — LIDOCAINE HYDROCHLORIDE 10 MG/ML
0.5 INJECTION, SOLUTION EPIDURAL; INFILTRATION; INTRACAUDAL; PERINEURAL ONCE AS NEEDED
Status: COMPLETED | OUTPATIENT
Start: 2021-09-01 | End: 2021-09-01

## 2021-09-01 RX ORDER — HYDROMORPHONE HYDROCHLORIDE 1 MG/ML
0.5 INJECTION, SOLUTION INTRAMUSCULAR; INTRAVENOUS; SUBCUTANEOUS
Status: DISCONTINUED | OUTPATIENT
Start: 2021-09-01 | End: 2021-09-01 | Stop reason: HOSPADM

## 2021-09-01 RX ORDER — SODIUM CHLORIDE, SODIUM LACTATE, POTASSIUM CHLORIDE, CALCIUM CHLORIDE 600; 310; 30; 20 MG/100ML; MG/100ML; MG/100ML; MG/100ML
9 INJECTION, SOLUTION INTRAVENOUS CONTINUOUS
Status: DISCONTINUED | OUTPATIENT
Start: 2021-09-01 | End: 2021-09-02 | Stop reason: HOSPADM

## 2021-09-01 RX ORDER — MELOXICAM 15 MG/1
15 TABLET ORAL ONCE
Status: COMPLETED | OUTPATIENT
Start: 2021-09-01 | End: 2021-09-01

## 2021-09-01 RX ORDER — ONDANSETRON 2 MG/ML
4 INJECTION INTRAMUSCULAR; INTRAVENOUS ONCE AS NEEDED
Status: DISCONTINUED | OUTPATIENT
Start: 2021-09-01 | End: 2021-09-01 | Stop reason: HOSPADM

## 2021-09-01 RX ORDER — HYDROMORPHONE HYDROCHLORIDE 1 MG/ML
0.5 INJECTION, SOLUTION INTRAMUSCULAR; INTRAVENOUS; SUBCUTANEOUS
Status: DISCONTINUED | OUTPATIENT
Start: 2021-09-01 | End: 2021-09-02 | Stop reason: HOSPADM

## 2021-09-01 RX ORDER — MELOXICAM 7.5 MG/1
15 TABLET ORAL DAILY
Status: DISCONTINUED | OUTPATIENT
Start: 2021-09-02 | End: 2021-09-02 | Stop reason: HOSPADM

## 2021-09-01 RX ORDER — BUPIVACAINE HYDROCHLORIDE 5 MG/ML
INJECTION, SOLUTION EPIDURAL; INTRACAUDAL AS NEEDED
Status: DISCONTINUED | OUTPATIENT
Start: 2021-09-01 | End: 2021-09-01 | Stop reason: SURG

## 2021-09-01 RX ORDER — FENTANYL CITRATE 50 UG/ML
50 INJECTION, SOLUTION INTRAMUSCULAR; INTRAVENOUS
Status: DISCONTINUED | OUTPATIENT
Start: 2021-09-01 | End: 2021-09-01 | Stop reason: HOSPADM

## 2021-09-01 RX ORDER — METOPROLOL SUCCINATE 100 MG/1
100 TABLET, EXTENDED RELEASE ORAL DAILY
Status: DISCONTINUED | OUTPATIENT
Start: 2021-09-02 | End: 2021-09-02 | Stop reason: HOSPADM

## 2021-09-01 RX ORDER — BUPIVACAINE HCL/0.9 % NACL/PF 0.125 %
PLASTIC BAG, INJECTION (ML) EPIDURAL AS NEEDED
Status: DISCONTINUED | OUTPATIENT
Start: 2021-09-01 | End: 2021-09-01 | Stop reason: SURG

## 2021-09-01 RX ORDER — PREGABALIN 75 MG/1
75 CAPSULE ORAL ONCE
Status: COMPLETED | OUTPATIENT
Start: 2021-09-01 | End: 2021-09-01

## 2021-09-01 RX ORDER — TRAMADOL HYDROCHLORIDE 50 MG/1
50 TABLET ORAL EVERY 8 HOURS PRN
Status: DISCONTINUED | OUTPATIENT
Start: 2021-09-01 | End: 2021-09-02 | Stop reason: HOSPADM

## 2021-09-01 RX ORDER — NALOXONE HCL 0.4 MG/ML
0.1 VIAL (ML) INJECTION
Status: DISCONTINUED | OUTPATIENT
Start: 2021-09-01 | End: 2021-09-02 | Stop reason: HOSPADM

## 2021-09-01 RX ORDER — SODIUM CHLORIDE, SODIUM LACTATE, POTASSIUM CHLORIDE, CALCIUM CHLORIDE 600; 310; 30; 20 MG/100ML; MG/100ML; MG/100ML; MG/100ML
100 INJECTION, SOLUTION INTRAVENOUS CONTINUOUS
Status: DISCONTINUED | OUTPATIENT
Start: 2021-09-01 | End: 2021-09-02 | Stop reason: HOSPADM

## 2021-09-01 RX ORDER — SODIUM CHLORIDE 0.9 % (FLUSH) 0.9 %
10 SYRINGE (ML) INJECTION EVERY 12 HOURS SCHEDULED
Status: DISCONTINUED | OUTPATIENT
Start: 2021-09-01 | End: 2021-09-01 | Stop reason: HOSPADM

## 2021-09-01 RX ADMIN — EPHEDRINE SULFATE 10 MG: 50 INJECTION INTRAVENOUS at 07:54

## 2021-09-01 RX ADMIN — FAMOTIDINE 20 MG: 20 TABLET, FILM COATED ORAL at 06:42

## 2021-09-01 RX ADMIN — SODIUM CHLORIDE, POTASSIUM CHLORIDE, SODIUM LACTATE AND CALCIUM CHLORIDE 100 ML/HR: 600; 310; 30; 20 INJECTION, SOLUTION INTRAVENOUS at 15:07

## 2021-09-01 RX ADMIN — Medication 50 MCG: at 08:40

## 2021-09-01 RX ADMIN — TRANEXAMIC ACID 1000 MG: 10 INJECTION, SOLUTION INTRAVENOUS at 09:24

## 2021-09-01 RX ADMIN — TRANEXAMIC ACID 1000 MG: 10 INJECTION, SOLUTION INTRAVENOUS at 07:48

## 2021-09-01 RX ADMIN — EPHEDRINE SULFATE 10 MG: 50 INJECTION INTRAVENOUS at 07:47

## 2021-09-01 RX ADMIN — CEFAZOLIN SODIUM 2 G: 2 INJECTION, SOLUTION INTRAVENOUS at 07:26

## 2021-09-01 RX ADMIN — Medication 100 MCG: at 08:25

## 2021-09-01 RX ADMIN — DEXAMETHASONE SODIUM PHOSPHATE 4 MG: 4 INJECTION, SOLUTION INTRA-ARTICULAR; INTRALESIONAL; INTRAMUSCULAR; INTRAVENOUS; SOFT TISSUE at 07:59

## 2021-09-01 RX ADMIN — SODIUM CHLORIDE, POTASSIUM CHLORIDE, SODIUM LACTATE AND CALCIUM CHLORIDE: 600; 310; 30; 20 INJECTION, SOLUTION INTRAVENOUS at 09:32

## 2021-09-01 RX ADMIN — ACETAMINOPHEN 1000 MG: 500 TABLET, FILM COATED ORAL at 15:08

## 2021-09-01 RX ADMIN — Medication 50 MCG: at 08:36

## 2021-09-01 RX ADMIN — ACETAMINOPHEN 1000 MG: 500 TABLET, FILM COATED ORAL at 20:51

## 2021-09-01 RX ADMIN — ONDANSETRON 4 MG: 2 INJECTION INTRAMUSCULAR; INTRAVENOUS at 07:59

## 2021-09-01 RX ADMIN — MELOXICAM 15 MG: 15 TABLET ORAL at 06:42

## 2021-09-01 RX ADMIN — PREGABALIN 75 MG: 75 CAPSULE ORAL at 06:42

## 2021-09-01 RX ADMIN — EPHEDRINE SULFATE 10 MG: 50 INJECTION INTRAVENOUS at 08:17

## 2021-09-01 RX ADMIN — LISINOPRIL 40 MG: 40 TABLET ORAL at 15:08

## 2021-09-01 RX ADMIN — MUPIROCIN 1 APPLICATION: 20 OINTMENT TOPICAL at 06:42

## 2021-09-01 RX ADMIN — LIDOCAINE HYDROCHLORIDE 0.5 ML: 10 INJECTION, SOLUTION EPIDURAL; INFILTRATION; INTRACAUDAL; PERINEURAL at 06:42

## 2021-09-01 RX ADMIN — PROPOFOL 50 MCG/KG/MIN: 10 INJECTION, EMULSION INTRAVENOUS at 07:45

## 2021-09-01 RX ADMIN — BUPIVACAINE HYDROCHLORIDE 11 MG: 5 INJECTION, SOLUTION EPIDURAL; INTRACAUDAL at 07:33

## 2021-09-01 RX ADMIN — ACETAMINOPHEN 1000 MG: 500 TABLET, FILM COATED ORAL at 06:42

## 2021-09-01 RX ADMIN — CEFAZOLIN SODIUM 2 G: 2 INJECTION, SOLUTION INTRAVENOUS at 15:08

## 2021-09-01 RX ADMIN — Medication 100 MCG: at 08:30

## 2021-09-01 RX ADMIN — SODIUM CHLORIDE, POTASSIUM CHLORIDE, SODIUM LACTATE AND CALCIUM CHLORIDE 9 ML/HR: 600; 310; 30; 20 INJECTION, SOLUTION INTRAVENOUS at 06:42

## 2021-09-01 NOTE — H&P
"Pre-Op H&P  Benito Forman  [id number]  1940    Chief complaint: right hip pain    HPI:    Patient is a 81 y.o.male who presents with a non-traumatic fracture of the head of the right femur. He is s/p left total hip arthroplasty in March 2020. He states the pain is intense, preventing him from laying flat and reports his leg \"gives way\" occasionally with standing. He states he has the most relief when sitting upright. He presents to the operating room today for surgical management with a cemented right anterior total hip arthroplasty.     Review of Systems:  General ROS: negative for chills, fever or skin lesions;  No changes since last office visit.  Neg for recent sick exposure  Cardiovascular ROS: no chest pain or dyspnea on exertion  Respiratory ROS: no cough, shortness of breath, or wheezing    Allergies: No Known Allergies    Home Meds:    No current facility-administered medications on file prior to encounter.     Current Outpatient Medications on File Prior to Encounter   Medication Sig Dispense Refill   • aspirin 81 MG EC tablet Take 81 mg by mouth Daily. Was instructed by Dr. Jc's office to have last dose 2-29-20     • dilTIAZem CD (CARDIZEM CD) 180 MG 24 hr capsule Take 180 mg by mouth Daily.     • levothyroxine (SYNTHROID, LEVOTHROID) 75 MCG tablet Take 75 mcg by mouth Daily.     • lisinopril (PRINIVIL,ZESTRIL) 40 MG tablet Take 40 mg by mouth Daily.     • metoprolol succinate XL (TOPROL-XL) 100 MG 24 hr tablet Take 100 mg by mouth Daily.     • rosuvastatin (CRESTOR) 10 MG tablet Take 10 mg by mouth Daily.         PMH:   Past Medical History:   Diagnosis Date   • Arthritis    • Coronary artery disease     stent times 1   • Disease of thyroid gland    • Elevated cholesterol    • Hearing aid worn    • Heart attack (CMS/HCC)     2007   • Hypertension    • Irregular heart beat    • Pacemaker    • Wears dentures    • Wears glasses      PSH:    Past Surgical History:   Procedure Laterality Date   • " "CARDIAC CATHETERIZATION     • CATARACT EXTRACTION, BILATERAL     • CHOLECYSTECTOMY     • COLONOSCOPY  2015   • CORONARY ANGIOPLASTY WITH STENT PLACEMENT     • ELBOW FUSION     • PACEMAKER IMPLANTATION     • POLYPECTOMY     • TOTAL HIP ARTHROPLASTY Left 3/11/2020    Procedure: TOTAL HIP ARTHROPLASTY ANTERIOR DIRECT CEMENTED LEFT;  Surgeon: Vishnu Jc MD;  Location: [facility];  Service: Orthopedics;  Laterality: Left;       Immunization History:  Influenza: 2020  Pneumococcal: patient unsure of date   Tetanus: >10 years     Social History:   Tobacco:   Social History     Tobacco Use   Smoking Status Never Smoker   Smokeless Tobacco Never Used      Alcohol:     Social History     Substance and Sexual Activity   Alcohol Use Never       Vitals:           /84 (BP Location: Right arm)   Pulse 51   Temp 97.4 °F (36.3 °C) (Temporal)   Resp 18   Ht 182.9 cm (72\")   Wt 83.9 kg (185 lb)   SpO2 94%   BMI 25.09 kg/m²     Physical Exam:  General Appearance:    Alert, cooperative, no distress, appears stated age   Head:    Normocephalic, without obvious abnormality, atraumatic   Lungs:     Clear to auscultation bilaterally, respirations unlabored    Heart:   Regular rate and rhythm, S1 and S2 normal, no murmur, rub    or gallop    Abdomen:    Soft, nontender.  +bowel sounds   Breast Exam:    deferred   Genitalia:    deferred   Extremities:   Extremities normal, atraumatic, no cyanosis or edema   Skin:   Skin color, texture, turgor normal, no rashes or lesions   Neurologic:   Grossly intact   Results Review  LABS:  Lab Results   Component Value Date    WBC 9.28 08/31/2021    HGB 14.3 08/31/2021    HCT 43.5 08/31/2021    MCV 92.4 08/31/2021     08/31/2021    NEUTROABS 6.16 08/31/2021    GLUCOSE 88 08/31/2021    BUN 20 08/31/2021    CREATININE 0.80 08/31/2021    EGFRIFNONA 93 08/31/2021     08/31/2021    K 3.3 (L) 08/31/2021     08/31/2021    CO2 26.0 08/31/2021    CALCIUM 9.4 08/31/2021    " ALBUMIN 4.40 08/31/2021    AST 36 08/31/2021    ALT 42 (H) 08/31/2021    BILITOT 0.7 08/31/2021    PTT 28.4 08/31/2021    INR 1.00 08/31/2021       RADIOLOGY:  Adult Transthoracic Echo Complete w/ Color, Spectral and Contrast if necessary per protocol    Result Date: 8/31/2021  · Estimated left ventricular EF = 55% Left ventricular ejection fraction appears to be 51 - 55%. Left ventricular systolic function is normal. · Left ventricular wall thickness is consistent with mild concentric hypertrophy. · Left ventricular diastolic function is consistent with (grade I) impaired relaxation.      XR Chest PA & Lateral    Result Date: 8/31/2021  EXAMINATION: XR CHEST PA AND LATERAL- 08/31/2021  INDICATION: preop  COMPARISON: Chest x-ray 03/03/2020  FINDINGS: PA and lateral views of the chest reveal cardiac size within normal limits. Pulmonary vascularity within normal limits. Mild asymmetric elevation of the right hemidiaphragm again noted. No pneumothorax or pleural effusion. Left chest wall pacing device with leads intact.        No acute cardiopulmonary process.  D:  08/31/2021 E:  08/31/2021  This report was finalized on 8/31/2021 6:47 PM by Dr. Jameel Recinos.         Cancer Staging (if applicable)  Cancer Patient: __ yes _x_no __unknown; If yes, clinical stage T:__ N:__M:__, stage group or __N/A    Impression: Right femoral head fracture     Plan: Right anterior total hip arthroplasty - cemented    Víctor Carver PA-C   9/1/2021   06:37 EDT

## 2021-09-01 NOTE — ANESTHESIA PREPROCEDURE EVALUATION
Anesthesia Evaluation     Patient summary reviewed and Nursing notes reviewed   NPO Solid Status: > 8 hours  NPO Liquid Status: > 2 hours           Airway   Mallampati: I  TM distance: >3 FB  Neck ROM: full  No difficulty expected  Dental      Pulmonary     breath sounds clear to auscultation  Cardiovascular     ECG reviewed  Rhythm: regular  Rate: normal    (+) hypertension, CAD, hyperlipidemia,       Neuro/Psych  GI/Hepatic/Renal/Endo      Musculoskeletal     Abdominal    Substance History      OB/GYN          Other   arthritis,      ROS/Med Hx Other: · Estimated left ventricular EF = 55% Left ventricular ejection fraction appears to be 51 - 55%. Left ventricular systolic function is normal.  · Left ventricular wall thickness is consistent with mild concentric hypertrophy.  · Left ventricular diastolic function is consistent with (grade I) impaired relaxation.    Last dose Xarelto 4 days prior                   Anesthesia Plan    ASA 3     spinal     intravenous induction     Anesthetic plan, all risks, benefits, and alternatives have been provided, discussed and informed consent has been obtained with: patient.    Plan discussed with CRNA.

## 2021-09-01 NOTE — PLAN OF CARE
Pt alert and oriented, admitted from PACU, right hip optifoam dressing is CDI, pt has ambulated and been up to chair. Voiding. Pt coccyx area is red/purple with open areas, wound consult ordered. VSS

## 2021-09-01 NOTE — H&P
Patient Name: Benito Forman  MRN: [id number]  : 1940  DOS: 2021    Attending: Vishnu Jc MD    Primary Care Provider: Andrew Jon      Chief complaint: Right hip pain    Subjective   Patient is a pleasant 81 y.o. male presented for scheduled surgery by Dr. Jc.    Patient is known to me from prior hospitalization in 2020 when he had left total hip arthroplasty, he did very well postoperatively and was discharged home on postop day 1.    Per Dr. Jc's note (81 y.o. male who was admitted to [facility] he is known to me for osteoarthritis of his right hip but had acute decompensation and inability to walk x-rays revealed a femoral head fracture and collapse of his femoral head likely secondary to avascular necrosis  The patient was  including but not limited to infection, DVT, pulmonary embolism, leg length discrepancy, recurrent dislocation, possibility of injury to nerves and vessels, and periprosthetic fractures have been discussed with the patient. Despite the risks involved the patient elected to proceed with an informed consent was obtained.).  indicated for a total hip arthroplasty. Likely risk benefits of the procedure  Patient underwent right total hip arthroplasty, anterior, under spinal anesthesia, tolerated surgery well, is being admitted for the management.    Seen in PACU, doing fairly well, no complains of nausea, vomiting, or shortness of breath.  He is drowsy but answers appropriately.    He is on chronic anticoagulation was Xarelto which is on hold for surgery.    He has history of permanent pacemaker.      Allergies:  No Known Allergies    Meds:  Medications Prior to Admission   Medication Sig Dispense Refill Last Dose   • aspirin 81 MG EC tablet Take 81 mg by mouth Daily. Was instructed by Dr. Jc's office to have last dose 20 at 0400   • dilTIAZem CD (CARDIZEM CD) 180 MG 24 hr capsule Take 180 mg by mouth Daily.    "9/1/2021 at 0400   • levothyroxine (SYNTHROID, LEVOTHROID) 75 MCG tablet Take 75 mcg by mouth Daily.   9/1/2021 at 0400   • lisinopril (PRINIVIL,ZESTRIL) 40 MG tablet Take 40 mg by mouth Daily.   8/31/2021 at 0800   • metoprolol succinate XL (TOPROL-XL) 100 MG 24 hr tablet Take 100 mg by mouth Daily.   9/1/2021 at 0400   • rivaroxaban (XARELTO) 20 MG tablet Take 20 mg by mouth Daily. August 28, 2021 last dose of xarelto   Past Week at G   • rosuvastatin (CRESTOR) 10 MG tablet Take 10 mg by mouth Daily.   9/1/2021 at 0400         History:   Past Medical History:   Diagnosis Date   • Arthritis    • Coronary artery disease     stent times 1   • Disease of thyroid gland    • Elevated cholesterol    • Hearing aid worn    • Heart attack (CMS/HCC)     2007   • Hypertension    • Irregular heart beat    • Pacemaker    • Wears dentures    • Wears glasses      Past Surgical History:   Procedure Laterality Date   • CARDIAC CATHETERIZATION     • CATARACT EXTRACTION, BILATERAL     • CHOLECYSTECTOMY     • COLONOSCOPY  2015   • CORONARY ANGIOPLASTY WITH STENT PLACEMENT     • ELBOW FUSION     • PACEMAKER IMPLANTATION     • POLYPECTOMY     • TOTAL HIP ARTHROPLASTY Left 3/11/2020    Procedure: TOTAL HIP ARTHROPLASTY ANTERIOR DIRECT CEMENTED LEFT;  Surgeon: Vishnu Jc MD;  Location: [facility];  Service: Orthopedics;  Laterality: Left;     History reviewed. No pertinent family history.  Social History     Tobacco Use   • Smoking status: Never Smoker   • Smokeless tobacco: Never Used   Substance Use Topics   • Alcohol use: Never   • Drug use: Never       Review of Systems  Pertinent items are noted in HPI, all other systems reviewed and negative    Vital Signs  /75   Pulse 70   Temp 98.6 °F (37 °C) (Temporal)   Resp 16   Ht 182.9 cm (72\")   Wt 83.9 kg (185 lb)   SpO2 95%   BMI 25.09 kg/m²     Physical Exam:    General Appearance:   Drowsy cooperative, in no acute distress   Head:    Normocephalic, without obvious " abnormality, atraumatic   Eyes:            Lids and lashes normal, conjunctivae and sclerae normal, no   icterus, no pallor, corneas clear    Ears:    Ears appear intact with no abnormalities noted   Throat:   No oral lesions, no thrush, oral mucosa moist   Neck:   No adenopathy, supple, trachea midline, no thyromegaly         Lungs:     Clear to auscultation,respirations regular, even and   unlabored. No wheezes or rales.    Heart:    Regular rhythm and normal rate, normal S1 and S2, no murmur, no gallop   Abdomen:     Normal bowel sounds, no masses, no organomegaly, soft        non-tender, non-distended, no guarding, no rebound                 tenderness   Genitalia:    Deferred   Extremities:  Right LE, CDI dressing over right hip incision.  No clubbing, cyanosis, or edema distally.   Pulses:   Pulses palpable and equal bilaterally   Skin:   No bleeding, bruising or rash   Neurologic:   Cranial nerves 2 - 12 grossly intact, no gross motor deficit.      I reviewed the patient's new clinical results.       Results from last 7 days   Lab Units 08/31/21  0838   WBC 10*3/mm3 9.28   HEMOGLOBIN g/dL 14.3   HEMATOCRIT % 43.5   PLATELETS 10*3/mm3 295     Results from last 7 days   Lab Units 08/31/21  0838   SODIUM mmol/L 144   POTASSIUM mmol/L 3.3*   CHLORIDE mmol/L 105   CO2 mmol/L 26.0   BUN mg/dL 20   CREATININE mg/dL 0.80   CALCIUM mg/dL 9.4   BILIRUBIN mg/dL 0.7   ALK PHOS U/L 108   ALT (SGPT) U/L 42*   AST (SGOT) U/L 36   GLUCOSE mg/dL 88     Lab Results   Component Value Date    HGBA1C 5.70 (H) 08/31/2021           Assessment and Plan:       Status post total replacement of right hip    Hypertension    Hypothyroid    Hyperlipidemia    Right hip pain      Plan:    1. PT/OT,  Weight bearing as tolerated right LE.    2. Pain control-prns  3. IS-encourage  4. DVT proph- Mechanicals and Xarelto  5. Bowel regimen  6. Resume home medications as appropriate  7. Monitor post-op labs  8. DC planning for  home    -Dyslipidemia:  Resume home regimen statin ( formulary substitution when appropriate).    -Chronic anticoagulation: Resume Xarelto 10 mg daily tomorrow for 1 week prior to resuming 20 mg daily.    - Hypertension:  Resume home medications as appropriate, formulary substitution when indicated.  Holding parameters.  Prn medications for elevated blood pressure.        Dragon disclaimer:  Part of this encounter note is an electronic transcription/translation of spoken language to printed text. The electronic translation of spoken language may permit erroneous, or at times, nonsensical words or phrases to be inadvertently transcribed; Although I have reviewed the note for such errors, some may still exist.    Mikaela Brennan MD  09/01/21  10:43 EDT

## 2021-09-01 NOTE — ANESTHESIA POSTPROCEDURE EVALUATION
Patient: Benito Forman    Procedure Summary     Date: 09/01/21 Room / Location:  ANY OR  /  ANY OR    Anesthesia Start: 0726 Anesthesia Stop:     Procedure: RIGHT ANTERIOR TOTAL HIP ARTHROPLASTY CEMENTED ISAC (Right Hip) Diagnosis:       Avascular necrosis of bone of right hip (CMS/HCC)      (Avascular necrosis of bone of right hip (CMS/HCC) [7257963])    Surgeons: Vishnu Jc MD Provider: Adalberto Arthur MD    Anesthesia Type: general ASA Status: 3          Anesthesia Type: general    Vitals  Vitals Value Taken Time   BP     Temp     Pulse     Resp     SpO2 96 % 09/01/21 1000   Vitals shown include unvalidated device data.        Post Anesthesia Care and Evaluation    Patient location during evaluation: PACU  Patient participation: complete - patient participated  Level of consciousness: awake and alert  Pain management: adequate  Airway patency: patent  Anesthetic complications: No anesthetic complications  PONV Status: none  Cardiovascular status: hemodynamically stable and acceptable  Respiratory status: nonlabored ventilation, acceptable and nasal cannula  Hydration status: acceptable

## 2021-09-01 NOTE — THERAPY EVALUATION
Patient Name: Benito Forman  : 1940    MRN: [id number]                              Today's Date: 2021       Admit Date: 2021    Visit Dx:     ICD-10-CM ICD-9-CM   1. Right hip pain  M25.551 719.45     Patient Active Problem List   Diagnosis   • Arthritis of left hip   • Status post total replacement of left hip   • Hypertension   • Hypothyroid   • Hyperlipidemia   • Acute blood loss anemia, mild asymptomatic   • Right hip pain   • Status post total replacement of right hip     Past Medical History:   Diagnosis Date   • Arthritis    • Coronary artery disease     stent times 1   • Disease of thyroid gland    • Elevated cholesterol    • Hearing aid worn    • Heart attack (CMS/HCC)        • Hypertension    • Irregular heart beat    • Pacemaker    • Wears dentures    • Wears glasses      Past Surgical History:   Procedure Laterality Date   • CARDIAC CATHETERIZATION     • CATARACT EXTRACTION, BILATERAL     • CHOLECYSTECTOMY     • COLONOSCOPY     • CORONARY ANGIOPLASTY WITH STENT PLACEMENT     • ELBOW FUSION     • PACEMAKER IMPLANTATION     • POLYPECTOMY     • TOTAL HIP ARTHROPLASTY Left 3/11/2020    Procedure: TOTAL HIP ARTHROPLASTY ANTERIOR DIRECT CEMENTED LEFT;  Surgeon: Vishnu Jc MD;  Location: [facility];  Service: Orthopedics;  Laterality: Left;     General Information     Row Name 21 1515          Physical Therapy Time and Intention    Document Type  evaluation  -ZACHARY     Mode of Treatment  individual therapy;physical therapy  -ZACHARY     Row Name 21 1515          General Information    Patient Profile Reviewed  yes  -ZACHARY     Prior Level of Function  min assist:;all household mobility;transfer;bed mobility;ADL's  -ZACHARY     Existing Precautions/Restrictions  fall;right;hip, anterior  -ZACHARY     Barriers to Rehab  none identified  -ZACHARY     Row Name 21 1515          Living Environment    Lives With  spouse  -ZACHARY     Row Name 21 1515          Home Main Entrance    Number of  Stairs, Main Entrance  none  -ZACHARY     Row Name 09/01/21 1515          Stairs Within Home, Primary    Stairs, Within Home, Primary  0  -ZACHARY     Number of Stairs, Within Home, Primary  none  -ZACHARY     Row Name 09/01/21 1515          Cognition    Orientation Status (Cognition)  oriented x 4  -ZACHRAY     Row Name 09/01/21 1515          Safety Issues, Functional Mobility    Safety Issues Affecting Function (Mobility)  safety precaution awareness;safety precautions follow-through/compliance  -     Impairments Affecting Function (Mobility)  endurance/activity tolerance;strength;range of motion (ROM)  -       User Key  (r) = Recorded By, (t) = Taken By, (c) = Cosigned By    Initials Name Provider Type    ZACHARY Loki De Souza PT Physical Therapist        Mobility     Row Name 09/01/21 1515          Bed Mobility    Bed Mobility  scooting/bridging;supine-sit  -ZACHARY     Scooting/Bridging [place] (Bed Mobility)  supervision;verbal cues  -     Supine-Sit [place] (Bed Mobility)  supervision;verbal cues  -     Assistive Device (Bed Mobility)  bed rails;head of bed elevated  -     Comment (Bed Mobility)  Verbal cues for LE sequencing off of EOB and trunk control into sitting  -[facility] Name 09/01/21 1515          Transfers    Comment (Transfers)  Verbal cues for safe hand placement during standing/sitting and moving R LE out for comfort prior to sitting  -[facility] Name 09/01/21 1515          Sit-Stand Transfer    Sit-Stand [place] (Transfers)  verbal cues;contact guard  -     Assistive Device (Sit-Stand Transfers)  walker, front-wheeled  -     Row Name 09/01/21 1515          Gait/Stairs (Locomotion)    [place] Level (Gait)  verbal cues;contact guard;1 person to manage equipment  -     Assistive Device (Gait)  walker, front-wheeled  -     Distance in Feet (Gait)  240 feet  -     Deviations/Abnormal Patterns (Gait)  bilateral deviations;bethany decreased;gait speed decreased;stride length decreased  -ZACHARY      Bilateral Gait Deviations  forward flexed posture  -     Right Sided Gait Deviations  heel strike decreased;weight shift ability decreased  -     [place] Level (Stairs)  not tested  -     Comment (Gait/Stairs)  Pt ambulated with step through pattern and decreased speed. Verbal cues for maintaining upright posture, body within walker, increase step length, and WB through LEs. No knee buckling noted. Gait limited by fatigue.  -ZACHARY     Row Name 09/01/21 1515          Mobility    Extremity Weight-bearing Status  right lower extremity  -     Right Lower Extremity (Weight-bearing Status)  weight-bearing as tolerated (WBAT)  -       User Key  (r) = Recorded By, (t) = Taken By, (c) = Cosigned By    Initials Name Provider Type    Loki Concepcion, PT Physical Therapist        Obj/Interventions     Row Name 09/01/21 1515          Range of Motion Comprehensive    General Range of Motion  lower extremity range of motion deficits identified  -     Comment, General Range of Motion  R LE AROM impaired 25%; L LE AROM WFL; able to actively DF/PF  -ZACHARY     Row Name 09/01/21 1515          Strength Comprehensive (MMT)    General Manual Muscle Testing (MMT) Assessment  lower extremity strength deficits identified  -     Comment, General Manual Muscle Testing (MMT) Assessment  R LE functionally 4-/5; L LE functionally 4+/5  -ZACHARY     Row Name 09/01/21 1515          Motor Skills    Therapeutic Exercise  hip;knee;ankle  -ZACHARY     Row Name 09/01/21 1515          Hip (Therapeutic Exercise)    Hip (Therapeutic Exercise)  isometric exercises  -     Hip Isometrics (Therapeutic Exercise)  gluteal sets;10 repetitions  -ZACHARY     Row Name 09/01/21 1515          Knee (Therapeutic Exercise)    Knee (Therapeutic Exercise)  isometric exercises  -     Knee Isometrics (Therapeutic Exercise)  quad sets;10 repetitions  -ZACHARY     Row Name 09/01/21 1515          Ankle (Therapeutic Exercise)    Ankle (Therapeutic Exercise)  AROM (active range of  motion)  -ZACHARY     Ankle AROM (Therapeutic Exercise)  bilateral;dorsiflexion;plantarflexion;10 repetitions  -[facility] Name 09/01/21 1515          Sensory Assessment (Somatosensory)    Sensory Assessment (Somatosensory)  LE sensation intact  -ZACHARY       User Key  (r) = Recorded By, (t) = Taken By, (c) = Cosigned By    Initials Name Provider Type    Loki Concepcion, PT Physical Therapist        Goals/Plan     Row Name 09/01/21 1515          Bed Mobility Goal 1 (PT)    Activity/Assistive Device (Bed Mobility Goal 1, PT)  sit to supine/supine to sit  -ZACHARY     [place] Level/Cues Needed (Bed Mobility Goal 1, PT)  modified independence  -ZACHARY     Time Frame (Bed Mobility Goal 1, PT)  long term goal (LTG);3 days  -[facility] Name 09/01/21 1515          Transfer Goal 1 (PT)    Activity/Assistive Device (Transfer Goal 1, PT)  sit-to-stand/stand-to-sit;walker, rolling  -ZACHARY     [place] Level/Cues Needed (Transfer Goal 1, PT)  modified independence  -ZACHARY     Time Frame (Transfer Goal 1, PT)  long term goal (LTG);3 days  -     Row Name 09/01/21 1515          Gait Training Goal 1 (PT)    Activity/Assistive Device (Gait Training Goal 1, PT)  gait (walking locomotion);walker, rolling  -ZACHARY     [place] Level (Gait Training Goal 1, PT)  modified independence  -ZACHARY     Distance (Gait Training Goal 1, PT)  300 feet  -ZACHARY     Time Frame (Gait Training Goal 1, PT)  long term goal (LTG);3 days  -       User Key  (r) = Recorded By, (t) = Taken By, (c) = Cosigned By    Initials Name Provider Type    Loki Concepcion, PT Physical Therapist        Clinical Impression     Row Name 09/01/21 1515          Pain    Additional Documentation  Pain Scale: Numbers Pre/Post-Treatment (Group)  -ZACHARY     Row Name 09/01/21 1515          Pain Scale: Numbers Pre/Post-Treatment    Pretreatment Pain Rating  0/10 - no pain  -     Posttreatment Pain Rating  0/10 - no pain  -ZACHARY     Row Name 09/01/21 1515          Therapy Assessment/Plan (PT)     Patient/Family Therapy Goals Statement (PT)  To return home  -ZACHARY     Rehab Potential (PT)  good, to achieve stated therapy goals  -     Criteria for Skilled Interventions Met (PT)  yes;meets criteria;skilled treatment is necessary  -     Row Name 09/01/21 1515          Positioning and Restraints    Pre-Treatment Position  in bed  -ZACHARY     Post Treatment Position  chair  -ZACHARY     In Chair  notified nsg;reclined;encouraged to call for assist;call light within reach;exit alarm on;with family/caregiver;legs elevated;compression device  -       User Key  (r) = Recorded By, (t) = Taken By, (c) = Cosigned By    Initials Name Provider Type    Loki Concepcion, PT Physical Therapist        Outcome Measures     Row Name 09/01/21 1605 09/01/21 1400       How much help from another person do you currently need...    Turning from your back to your side while in flat bed without using bedrails?  4  -ZACHARY  3  -SG    Moving from lying on back to sitting on the side of a flat bed without bedrails?  4  -ZACHARY  3  -SG    Moving to and from a bed to a chair (including a wheelchair)?  3  -ZACHARY  2  -SG    Standing up from a chair using your arms (e.g., wheelchair, bedside chair)?  3  -ZACHARY  2  -SG    Climbing 3-5 steps with a railing?  2  -ZACHARY  2  -SG    To walk in hospital room?  3  -ZACHARY  2  -SG    AM-PAC 6 Clicks Score (PT)  19  -ZACHARY  14  -SG    Row Name 09/01/21 1605          PADD    Diagnosis  2  -ZACHARY     Gender  2  -ZACHARY     Age Group  0  -ZACHARY     Gait Distance  1  -ZACHARY     Assist Level  1  -ZACHARY     Home Support  3  -ZACHARY     PADD Score  9  -ZACHARY     Patient Preference  home with home health  -ZACHARY     Prediction by PADD Score  directly home (with home health or out-patient rehab)  -     Row Name 09/01/21 1605          Functional Assessment    Outcome Measure Options  AM-PAC 6 Clicks Basic Mobility (PT);PADD  -ZACHARY       User Key  (r) = Recorded By, (t) = Taken By, (c) = Cosigned By    Initials Name Provider Type    Zeenat Ramsay, RN Registered  Nurse    Loki Concepcion, PT Physical Therapist                       Physical Therapy Education                 Title: PT OT SLP Therapies (In Progress)     Topic: Physical Therapy (Done)     Point: Mobility training (Done)     Learning Progress Summary           Patient Acceptance, E,D, VU by ZACHARY at 9/1/2021 1515    Comment: Educated on safe sequencing with bed mobility, ambulatory transfers, and gait. Reviewed HEP and hip precautions.                   Point: Home exercise program (Done)     Learning Progress Summary           Patient Acceptance, E,D, VU by ZACHARY at 9/1/2021 1515    Comment: Educated on safe sequencing with bed mobility, ambulatory transfers, and gait. Reviewed HEP and hip precautions.                   Point: Body mechanics (Done)     Learning Progress Summary           Patient Acceptance, E,D, VU by ZACHARY at 9/1/2021 1515    Comment: Educated on safe sequencing with bed mobility, ambulatory transfers, and gait. Reviewed HEP and hip precautions.                   Point: Precautions (Done)     Learning Progress Summary           Patient Acceptance, E,D, VU by ZACHARY at 9/1/2021 1515    Comment: Educated on safe sequencing with bed mobility, ambulatory transfers, and gait. Reviewed HEP and hip precautions.                               User Key     Initials Effective Dates Name Provider Type Discipline    ZACHARY 06/16/21 -  Loki De Souza, PT Physical Therapist PT              PT Recommendation and Plan  Planned Therapy Interventions (PT): balance training, bed mobility training, gait training, home exercise program, patient/family education, transfer training, strengthening  Plan of Care Reviewed With: patient  Progress: improving  Outcome Summary: PT eval complete. Pt ambulated 240 feet using RW, CGA, and one person to manage equipment. Gait limited by fatigue. Bed mobility performed with supervision and STS with CGA. No knee buckling noted with ambulation. Reviewed HEP and anterior hip precautions. PADD score = 9.  ADLs assessed, pt does not require OT eval at this time. Recommend pt d/c home with assist and HHPT when appropriate.     Time Calculation:   PT Charges     Row Name 09/01/21 1515             Time Calculation    Start Time  1515  -ZACHARY      PT Received On  09/01/21  -ZACHARY      PT Goal Re-Cert Due Date  09/11/21  -ZACHARY         Time Calculation- PT    Total Timed Code Minutes- PT  10 minute(s)  -ZACHARY         Timed Charges    55479 - PT Therapeutic Exercise Minutes  2  -ZACHARY      45925 - Gait Training Minutes   8  -ZACHARY         Untimed Charges    PT Eval/Re-eval Minutes  34  -ZACHARY         Total Minutes    Timed Charges Total Minutes  10  -ZACHARY      Untimed Charges Total Minutes  34  -ZACHARY       Total Minutes  44  -ZACHARY        User Key  (r) = Recorded By, (t) = Taken By, (c) = Cosigned By    Initials Name Provider Type    Loki Concepcion, PT Physical Therapist        Therapy Charges for Today     Code Description Service Date Service Provider Modifiers Qty    95315033490 HC GAIT TRAINING EA 15 MIN 9/1/2021 Loki De Souza, PT GP 1    29655546664 HC PT EVAL LOW COMPLEXITY 3 9/1/2021 Loki De Souza, PT GP 1    39516404782 HC PT THER SUPP EA 15 MIN 9/1/2021 Loki De Souza, PT GP 3          PT G-Codes  Outcome Measure Options: AM-PAC 6 Clicks Basic Mobility (PT), PADD  AM-PAC 6 Clicks Score (PT): 19    Loki De Souza PT  9/1/2021

## 2021-09-01 NOTE — OP NOTE
TOTAL HIP ARTHROPLASTY ANTERIOR  Procedure Report    Patient Name:  Benito Forman  YOB: 1940    Date of Surgery:  9/1/2021     Indications:    81 y.o. male who was admitted to [facility] he is known to me for osteoarthritis of his right hip but had acute decompensation and inability to walk x-rays revealed a femoral head fracture and collapse of his femoral head likely secondary to avascular necrosis  The patient was indicated for a total hip arthroplasty. Likely risk benefits of the procedure including but not limited to infection, DVT, pulmonary embolism, leg length discrepancy, recurrent dislocation, possibility of injury to nerves and vessels, and periprosthetic fractures have been discussed with the patient. Despite the risks involved the patient elected to proceed with an informed consent was obtained.     Patient Identification:  Patient was seen in the prep, consent was reviewed, operative procedure was identified, surgical site and thigh marked.        Pre-op Diagnosis:   Avascular necrosis of bone of right hip (CMS/HCC) [9210923]       Post-Op Diagnosis Codes:     * Avascular necrosis of bone of right hip (CMS/HCC) [M87.051]    Procedure/CPT® Codes:      Procedure(s):  RIGHT ANTERIOR TOTAL HIP ARTHROPLASTY CEMENTED ISAC    Staff:  Surgeon(s):  Vishnu Jc MD    Anesthesia: Spinal    Estimated Blood Loss: 200 mL    Implants:    Implant Name Type Inv. Item Serial No.  Lot No. LRB No. Used Action   CMT BONE SIMPLEX/P TMYCIN FDOS 10PK - [id number] Implant CMT BONE SIMPLEX/P TMYCIN FDOS 10PK  ISAC MIGUEL ANGEL [id number] Right 2 Implanted   KT BONE BIO PREP 6EA C/S - [id number] Implant KT BONE BIO PREP 6EA C/S  ISAC MIGUEL ANGEL [id number] Right 1 Implanted   SUT CONTRL TISS STRATAFIX SYMM PDS PLUS MAYURI CT-1 45CM - [id number] Implant SUT CONTRL TISS STRATAFIX SYMM PDS PLUS MAYURI CT-1 45CM  [employer]  DIV [employer] J [employer] J  Right 1 Implanted   SUT NONABS MAXBRAID/PE NMBR2 HC5 38IN  T [id number] - [id number] Implant SUT NONABS MAXBRAID/PE NMBR2 HC5 38IN T [id number]  [employer]  Right 2 Implanted   SHLL ACET TRIDENT2 TRITANIUM C/HL 60MM - [id number] Implant SHLL ACET TRIDENT2 TRITANIUM C/HL 60MM  ISAC MIGUEL ANGEL [id number] Right 1 Implanted   SCRW HEX LP TRIDENT2 6.5X40MM - [id number] Implant SCRW HEX LP TRIDENT2 6.5X40MM  ISAC MIGUEL ANGEL [id number] Right 1 Implanted   LINER MDM CMTLS COCR SZG 48MM - [id number] Implant LINER MDM CMTLS COCR SZG 48MM  ISAC MIGUEL ANGEL [id number] Right 1 Implanted   STEM FEM EXETER V40 CMT OFFST 44MM SZ4 150MM - [id number] Implant STEM FEM EXETER V40 CMT OFFST 44MM SZ4 150MM  ISAC MIGUEL ANGEL [id number] Right 1 Implanted   INSRT HIP RESTOR ADM X3 [id number] - [id number] Implant INSRT HIP RESTOR ADM X3 [id number]  ISAC MIGUEL ANGEL [id number] Right 1 Implanted   HD FEM/HIP BIOLOX/DELTA V40 CERAM 28MM MIN2.7 - [id number] Implant HD FEM/HIP BIOLOX/DELTA V40 CERAM 28MM MIN2.7  ISAC MIGUEL ANGEL [id number] Right 1 Implanted       Specimen:          Specimens     ID Source Type Tests Collected By Collected At Frozen?    1 Hip, Right Tissue · FUNGAL CULTURE  · TISSUE / BONE CULTURE  · AFB CULTURE  · ANAEROBIC CULTURE 10 DAY INCUBATION   Vishnu Jc MD 9/1/21 0845     Description: FEMORAL HEAD    This specimen was not marked as sent.            Findings: Gross collapse of his femoral head with fragmentation of the entire femoral head down to the subcapital area of the head neck junction    Complications: None    Description of Procedure:   The patient was transferred to [facility] operating room preoperative antibiotics in form of Kefzol 2gm Given IV Prior to Skin Incision As Well As 1 G of Tranexemic Acid. Patient Received Spinal anesthesia and was transferred to the [place] Table. All Bony Prominences Were Padded Adequately. The Operative Hip Was Then Prepped and Draped in the Usual Sterile Fashion. Multiple timeouts Were Done Identifying the Correct Patient Surgical Site and Planned Procedure.  A  Skin Incision Was Made Overlying the Anterior Lateral Aspect of the Hip for about 10 Cm Just below and Lateral to the Anterior Superior Iliac Spine. Skin and Subcutaneous Tissue and Fascia Was Incised in Line with the Skin Incision Overlying the Tensor Fascia Zeynep Muscle. The Tensor Muscle Was Then Retracted Laterally and the Interval between Sartorius and Rectus Femoris Medially and the Tensor Fascia Muscle Were Developed. The Lateral Femoral Circumflex Vessels Were Identified They Were Ligated without Difficulty. The deep Fascia Was Incised and the Capsule of the Hip Joint Where Was Identified. We then placed a soft tissue protecting device deep to the rectus and the tensor. Retractors were then Placed Extracapsular the Capsule Was Then Incised in a T-Shaped Manner and the Anterior Posterior Capsules Were Then Tied with maxbraid suture . Following This Retractors Were Placed Intracapsularly. We Did Identify the Obvious signs of severe osteoarthritis upon Incising the Capsule. We Then Made Appropriate Releases Done on the Inferior Medial Neck and along the Saddle of the Femoral Neck Femoral Neck Remaining Was Identified and Osteotomy Was Done and According to the Preoperative Templating Is an Oscillating Saw. The Femoral Head and Cut Neck with Extracted Using a Corkscrew without Difficulty the Femoral Head Did Have Major Signs of Articular Cartilage Loss Loss or Superior Wear.  The Acetabular Cavity Was Exposed by Placing Retractors and taking Care to Protect the Anterior vascular Structures the Labrum Was Excised the pulvinar was Excised from the Cotyloid Fossa Acetabular Cavity Was Then Progressively Reamed Maintaining the Correct Inclination and Version under Image Intensifier Control. Adequate Medialization Was Obtained. Adequate Fit Was Found to Be Obtained with the Reamer Size of 60. The Stephanie T2 cup Size 60 Was Then Impacted into Position. This Found to Seat Satisfactorily with Adequate Inclination and  Anteversion. It Was Stable but we did Insert 1 6.5/40 Millimeter Lag Screw This Was Checked under Fluoroscopic Fluoroscopy and Found to Be in Good Position. Following this the cup was cleaned and then a dual mobility  liner impacted Following This the Periarticular Tissues Were Then Infiltrated with Local Anesthetic.  Attention Was Then Directed to the Proximal Femur the Proximal Femur Was Delivered Using a Trochanteric Hook Placed Just Distal to the Vastus Tubercle and Proximal to the gluet Idris Tendon. The leg was then Externally Rotated Gross Traction Released and Hyperextension and Adduction Was Done Using the Paz Table. Mechanical Lift on the Table Was Utilized to Support the Femur Appropriate Capsular Releases Were Made to Expose the Medial Randall of the Greater Trochanter and Proximal Femur Was Delivered the Femoral Canal Was Then Entered by Removing Lateral Femoral Neck and with a Box Chisel by Serial Broaching Adequate Fit Was Found to Be Obtained with the Size 44 N4 broach. We then trialed a -2.7 neck was reduced fluoroscopic imaging was used to assess leg length and offset was found to be symmetric. The trials were then removed a #44 N4 [place] cemented was implanted in appropriate anteversion. It sat very similar to our broach trial we chose the -2.7 neck ceramic. This was then reduced again fluoroscopy remaining images both lateral and AP of the femur showed the stem to be in good position AP pelvis showed symmetric leg length and offset. The hip was then taken through a range of motion and found to be stable. There were no acute fractures there and wound was then thoroughly irrigated saline we did use of more of the injection cocktail. Betadine irrigation was used followed by 3L of saline irrigation. Soft tissue hemostasis was secured and topical TXA was used. Sponge and needle instrument counts were correct maxibraid sutures directly anterior and posterior capsular flaps were tied to each other  then closed the fascial layer with a running #2 strata fix followed by 2-0 Vicryl and then Monocryl for the skin and skin affix. Once the skin affix had dried optifoam AG dressing placed over the top. The patient was then transferred to the recovery room in stable condition patient tolerated the procedure well there were no Medications the patient adequate distal pulses and good cap refill.  The patient will be mobilized by physical therapy received antibiotic prophylaxis postoperatively for 2 more doses given the first 24 hours. The patient was started on DVT prophylaxis with Xarelto 10 mg daily for 1 week and then resume home dose on starting postoperative day #1.  I discussed the satisfactory performance of the procedure with the patient's family and discussed the postoperative management.      Assistant Participation:  Surgeon(s):  Vishnu Jc MD    Assistant: Abby Yu PA-C assisted with proper preoperative positioning, preoperative templating, determingin availability of proper implants, prepping and draping of patient, manipulation placement of instruments, protection of ligaments and vital soft tissue structures, assistance in maintaining hemostasis and assistance with closure of the wound. Their skills and knowledge of the steps of operation and the desired outcome of each surgical step was crucial, allowing for efficient choreography of surgical procedure, and closure of the wound which lead to reduced surgical time, less blood loss, and less risk of complications for the patient.       Vishnu Jc MD     Date: 9/1/2021  Time: 09:48 EDT

## 2021-09-01 NOTE — PLAN OF CARE
Problem: Adult Inpatient Plan of Care  Goal: Plan of Care Review  Flowsheets (Taken 9/1/2021 3742)  Progress: improving  Plan of Care Reviewed With: patient  Outcome Summary: PT eval complete. Pt ambulated 240 feet using RW, CGA, and one person to manage equipment. Gait limited by fatigue. Bed mobility performed with supervision and STS with CGA. No knee buckling noted with ambulation. Reviewed HEP and anterior hip precautions. PADD score = 9. ADLs assessed, pt does not require OT eval at this time. Recommend pt d/c home with assist and HHPT when appropriate.   Goal Outcome Evaluation:  Plan of Care Reviewed With: patient        Progress: improving  Outcome Summary: PT eval complete. Pt ambulated 240 feet using RW, CGA, and one person to manage equipment. Gait limited by fatigue. Bed mobility performed with supervision and STS with CGA. No knee buckling noted with ambulation. Reviewed HEP and anterior hip precautions. PADD score = 9. ADLs assessed, pt does not require OT eval at this time. Recommend pt d/c home with assist and HHPT when appropriate.

## 2021-09-02 VITALS
TEMPERATURE: 97.7 F | HEART RATE: 72 BPM | RESPIRATION RATE: 18 BRPM | SYSTOLIC BLOOD PRESSURE: 143 MMHG | HEIGHT: 72 IN | WEIGHT: 185 LBS | OXYGEN SATURATION: 93 % | BODY MASS INDEX: 25.06 KG/M2 | DIASTOLIC BLOOD PRESSURE: 71 MMHG

## 2021-09-02 PROBLEM — R33.8 ACUTE URINARY RETENTION: Status: ACTIVE | Noted: 2021-09-02

## 2021-09-02 LAB
ANION GAP SERPL CALCULATED.3IONS-SCNC: 9 MMOL/L (ref 5–15)
BUN SERPL-MCNC: 19 MG/DL (ref 8–23)
BUN/CREAT SERPL: 25.3 (ref 7–25)
CALCIUM SPEC-SCNC: 8.4 MG/DL (ref 8.6–10.5)
CHLORIDE SERPL-SCNC: 105 MMOL/L (ref 98–107)
CO2 SERPL-SCNC: 22 MMOL/L (ref 22–29)
CREAT SERPL-MCNC: 0.75 MG/DL (ref 0.76–1.27)
GFR SERPL CREATININE-BSD FRML MDRD: 100 ML/MIN/1.73
GLUCOSE SERPL-MCNC: 135 MG/DL (ref 65–99)
HCT VFR BLD AUTO: 35.1 % (ref 37.5–51)
HGB BLD-MCNC: 11.5 G/DL (ref 13–17.7)
POTASSIUM SERPL-SCNC: 3.9 MMOL/L (ref 3.5–5.2)
SODIUM SERPL-SCNC: 136 MMOL/L (ref 136–145)

## 2021-09-02 PROCEDURE — 97116 GAIT TRAINING THERAPY: CPT

## 2021-09-02 PROCEDURE — 85014 HEMATOCRIT: CPT | Performed by: ORTHOPAEDIC SURGERY

## 2021-09-02 PROCEDURE — P9612 CATHETERIZE FOR URINE SPEC: HCPCS

## 2021-09-02 PROCEDURE — 97110 THERAPEUTIC EXERCISES: CPT

## 2021-09-02 PROCEDURE — 80048 BASIC METABOLIC PNL TOTAL CA: CPT | Performed by: ORTHOPAEDIC SURGERY

## 2021-09-02 PROCEDURE — 25010000003 CEFAZOLIN IN DEXTROSE 2-4 GM/100ML-% SOLUTION: Performed by: ORTHOPAEDIC SURGERY

## 2021-09-02 PROCEDURE — 85018 HEMOGLOBIN: CPT | Performed by: ORTHOPAEDIC SURGERY

## 2021-09-02 RX ORDER — OXYCODONE HYDROCHLORIDE 5 MG/1
5 TABLET ORAL EVERY 4 HOURS PRN
Refills: 0
Start: 2021-09-02

## 2021-09-02 RX ORDER — TAMSULOSIN HYDROCHLORIDE 0.4 MG/1
0.4 CAPSULE ORAL DAILY
Qty: 5 CAPSULE | Refills: 0 | Status: SHIPPED | OUTPATIENT
Start: 2021-09-02

## 2021-09-02 RX ORDER — NYSTATIN 100000 U/G
CREAM TOPICAL EVERY 12 HOURS SCHEDULED
Start: 2021-09-02

## 2021-09-02 RX ORDER — ACETAMINOPHEN 500 MG
1000 TABLET ORAL EVERY 8 HOURS
Qty: 42 TABLET | Refills: 0
Start: 2021-09-02 | End: 2021-09-09

## 2021-09-02 RX ORDER — TRAMADOL HYDROCHLORIDE 50 MG/1
50 TABLET ORAL EVERY 6 HOURS PRN
Start: 2021-09-02

## 2021-09-02 RX ORDER — ONDANSETRON 4 MG/1
4 TABLET, FILM COATED ORAL EVERY 8 HOURS PRN
Start: 2021-09-02

## 2021-09-02 RX ORDER — NYSTATIN 100000 U/G
CREAM TOPICAL EVERY 12 HOURS SCHEDULED
Status: DISCONTINUED | OUTPATIENT
Start: 2021-09-02 | End: 2021-09-02 | Stop reason: HOSPADM

## 2021-09-02 RX ORDER — DOCUSATE SODIUM 100 MG/1
100 CAPSULE, LIQUID FILLED ORAL 2 TIMES DAILY
Start: 2021-09-02

## 2021-09-02 RX ORDER — TAMSULOSIN HYDROCHLORIDE 0.4 MG/1
0.4 CAPSULE ORAL DAILY
Status: DISCONTINUED | OUTPATIENT
Start: 2021-09-02 | End: 2021-09-02 | Stop reason: HOSPADM

## 2021-09-02 RX ADMIN — CEFAZOLIN SODIUM 2 G: 2 INJECTION, SOLUTION INTRAVENOUS at 01:12

## 2021-09-02 RX ADMIN — NYSTATIN: 100000 CREAM TOPICAL at 10:46

## 2021-09-02 RX ADMIN — ACETAMINOPHEN 1000 MG: 500 TABLET, FILM COATED ORAL at 06:14

## 2021-09-02 RX ADMIN — ROSUVASTATIN CALCIUM 10 MG: 10 TABLET, COATED ORAL at 08:34

## 2021-09-02 RX ADMIN — RIVAROXABAN 10 MG: 10 TABLET, FILM COATED ORAL at 08:34

## 2021-09-02 RX ADMIN — TAMSULOSIN HYDROCHLORIDE 0.4 MG: 0.4 CAPSULE ORAL at 10:46

## 2021-09-02 RX ADMIN — ASPIRIN 81 MG: 81 TABLET, COATED ORAL at 08:35

## 2021-09-02 RX ADMIN — ACETAMINOPHEN 1000 MG: 500 TABLET, FILM COATED ORAL at 14:22

## 2021-09-02 RX ADMIN — MELOXICAM 15 MG: 7.5 TABLET ORAL at 08:35

## 2021-09-02 RX ADMIN — LEVOTHYROXINE SODIUM 75 MCG: 0.07 TABLET ORAL at 06:14

## 2021-09-02 RX ADMIN — OXYCODONE 5 MG: 5 TABLET ORAL at 06:55

## 2021-09-02 NOTE — DISCHARGE INSTRUCTIONS
[facility] INCISION CARE Primary Hip and Knee:  1. You have a sterile dressing in place. Only exchange the dressing if it becomes saturated (fluid draining out the sides of dressing) and notify the office. The dressing is water resistant. During the first 7 days after surgery, it is ok to shower. DO NOT scrub on or around the dressing. Do not submerge the dressing.  2. After 7 days, you can remove your dressing. Your sutures are all underneath your skin. There is a layer of glue over the incision. DO NOT pick at this or try to peal it off. If the edges do peel up it is ok to trim them as needed. It is OK to shower with the incision exposed. DO NOT scrub on or around the incision. DO NOT submerge the incision in pools, baths, or hot tubs for 1 month after surgery.  3. No creams or ointments to the incision for 1 month after surgery. After 1 month, it is recommended to massage the scar with vitamin E cream to help decrease scar formation.  4. Check incision every day and notify surgeon immediately if any of the following signs or symptoms are noted:  o Increase in redness  o Increase in swelling around the incision and of the entire extremity  o Increase in pain  o Drainage oozing from the incision  o Pulling apart of the edges of the incision  o Increase in overall body temperature (greater than 100.5 degrees)    Anticoagulants: You will be discharged on an anticoagulant. This is a prophylactic medication that helps prevent blood clots during your post-operative period. Most patients will be on Aspirin 81 mg Enteric coated every 12 hours orally for 30 days. Some patients, due to increased risk factors, will need to be on a stronger anticoagulant. Dr. Jc will discuss this need with you.   ? While taking the anticoagulant, you should avoid taking any additional aspirin, and limit ibuprofen (Advil or Motrin), Aleve (Naprosyn) or other non-steroidal anti-inflammatory medications.   ? Notify your surgeon immediately if  any selina bleeding is noted in the urine, stool, vomit, or from the nose or the incision. Blood in the stool will often appear as black rather than red. Blood in urine may appear as pink. Blood in vomit may appear as brown/black like coffee grounds.  ? You will need to apply pressure for longer periods of time to any cuts or abrasions to stop bleeding  ? Avoid alcohol while taking anticoagulants  Sequential Compression Device: You maybe be discharged home with a compression device that helps promote blood flow and prevent clots in your legs. Wear these at all times for the first two weeks.   Mobilization: The best way to avoid a blood clot is to get up and walk. 10 times a day get up and walk for 5 minutes for the first two weeks. Walking for longer periods of time will increase pain and swelling, making therapy more difficult. If taking any long travel (car or plane) in the first 1-2 months, be sure to get up and walk at least every one hour.     Stool Softeners: You will be at greater risk of constipation after surgery because of being less mobile and taking the pain medications.   ? Take stool softeners as instructed by your surgeon while on pain medications. Use over the counter Colace 100 mg 1-2 capsules twice daily.   ? If stools become too loose or too frequent, decreases the dosage or stop the stool softener.  ? If constipation occurs despite use of stool softeners, you are to continue the stool softeners and add a laxative (Milk of Magnesia 1 ounce daily as needed).  ? Dulcolax oral tabs or suppository or a fleets enema can also be utilized for constipation and can be obtained over the counter.   ? If above interventions are unsuccessful in inducing bowel movements, please contact your family physician's office/surgeon's office.  ? Drink plenty of fluids and eat fruits and vegetables during your recovery time    Pain Medications utilized after surgery are narcotics and the law requires that the following  information be given to all patients that are prescribed narcotics:  ? CLASSIFICATION: Pain medications are called Opioids and are narcotics  ? LEGALITIES: It is illegal to share narcotics with others and to drive within 24 hours of taking narcotics  ? POTENTIAL SIDE EFFECTS: Potential side effects of opioids include: nausea, vomiting, itching, dizziness, drowsiness, dry mouth, constipation, and difficulty urinating.  ? POTENTIAL ADVERSE EFFECTS:   o Opioid tolerance can develop with use of pain medications and this simply means that it requires more and more of the medication to control pain; however, this is seen more in patients that use Opioids for longer periods of time.  o Opioid dependence can develop with use of Opioids and this simply means that to stop the medication can cause withdrawal symptoms; however, this is seen with patients that use Opioids for longer periods of time.  o Opioid addiction can develop with use of Opioids and the incidence of this is very unlikely in patients who take the medications as ordered and stop the medications as instructed.  o Opioid overdose can be dangerous, but is unlikely when the medication is taken as ordered and stopped when ordered. It is important not to mix opioids with alcohol or with any type of sedative, such as Benadryl, as this can lead to over sedation and respiratory difficulty.  ? DOSAGE:   o Pain medications may be needed consistently for the first week to decrease pain and promote adequate pain relief and participation in physical therapy.  o After the initial surgical pain begins to resolve, you may begin to decrease the pain medication and only take it as needed. By the end of 6 weeks, you should be off of pain medications.  o You can decrease your pain medication consumption by slowly spacing out the time in between the medication, and using 650mg Tylenol when the pain is not as severe. Do not exceed 3500mg of Tylenol in 24 hours.   o Refills will not  be given by the office during evening hours, on weekends, or after 6 weeks post-op.  o To seek refills on pain medications during the initial 6 week post-operative period, you must call the office 48 hours in advance to request the refill. The office will then notify you when to  the prescription. DO NOT wait until you are out of the medication to request a refill.

## 2021-09-02 NOTE — PROGRESS NOTES
Orthopedic Progress Note      Patient: Benito Forman  YOB: 1940     Date of Admission: 9/1/2021  5:46 AM Medical Record Number: [id number]     Attending Physician: Vishnu Jc MD    Status Post:  Procedure(s):  ANTERIOR TOTAL HIP ARTHROPLASTY CEMENTED ISAC RIGHT Post Operative Day Number: 1    Subjective : No new orthopaedic complaints     Pain Relief: some relief with present medication.     Systemic Complaints: No Complaints  Vitals:    09/01/21 2321 09/02/21 0527 09/02/21 0741 09/02/21 0834   BP: 100/64 106/69 102/73 100/57   BP Location: Right arm Right arm Right arm    Patient Position: Lying Lying Lying    Pulse: 68 54 57 52   Resp: 18 18 16    Temp: 98.4 °F (36.9 °C) 98.1 °F (36.7 °C) 98 °F (36.7 °C)    TempSrc: Oral Oral Oral    SpO2: 92% 92% 94%    Weight:       Height:           Physical Exam: 81 y.o. male    General Appearance:       Alert, cooperative, in no acute distress                  Extremities:    Dressing Clean, Dry and Intact             No clinical sign of DVT        Able to do good movements of digits    Pulses:   Pulses palpable and equal bilaterally           Diagnostic Tests:     Results from last 7 days   Lab Units 09/02/21  0335 08/31/21  0838   WBC 10*3/mm3  --  9.28   HEMOGLOBIN g/dL 11.5* 14.3   HEMATOCRIT % 35.1* 43.5   PLATELETS 10*3/mm3  --  295     Results from last 7 days   Lab Units 09/02/21  0335 08/31/21  0838   SODIUM mmol/L 136 144   POTASSIUM mmol/L 3.9 3.3*   CHLORIDE mmol/L 105 105   CO2 mmol/L 22.0 26.0   BUN mg/dL 19 20   CREATININE mg/dL 0.75* 0.80   GLUCOSE mg/dL 135* 88   CALCIUM mg/dL 8.4* 9.4     Results from last 7 days   Lab Units 08/31/21  0838   INR  1.00   APTT seconds 28.4     No results found for: URICACID  No results found for: CRYSTAL  Microbiology Results (last 10 days)     Procedure Component Value - Date/Time    Tissue / Bone Culture - Tissue, Hip, Right [[id number]] Collected: 09/01/21 0845    Lab Status: Preliminary result  Specimen: Tissue from Hip, Right Updated: 09/02/21 0726     Tissue Culture No growth     Gram Stain Rare (1+) WBCs seen      No organisms seen    MRSA Screen, PCR (Inpatient) - Swab, Nares [[id number]]  (Normal) Collected: 08/31/21 0838    Lab Status: Final result Specimen: Swab from Nares Updated: 08/31/21 1114     MRSA PCR Negative    Narrative:      MRSA Negative    COVID PRE-OP / PRE-PROCEDURE SCREENING ORDER (NO ISOLATION) - Swab, Nasopharynx [[id number]]  (Normal) Collected: 08/31/21 0838    Lab Status: Final result Specimen: Swab from Nasopharynx Updated: 08/31/21 0927    Narrative:      The following orders were created for panel order COVID PRE-OP / PRE-PROCEDURE SCREENING ORDER (NO ISOLATION) - Swab, Nasopharynx.  Procedure                               Abnormality         Status                     ---------                               -----------         ------                     COVID-19 and FLU A/B PCR...[[id number]]  Normal              Final result                 Please view results for these tests on the individual orders.    COVID-19 and FLU A/B PCR - Swab, Nasopharynx [[id number]]  (Normal) Collected: 08/31/21 0838    Lab Status: Final result Specimen: Swab from Nasopharynx Updated: 08/31/21 0927     COVID19 Not Detected     Influenza A PCR Not Detected     Influenza B PCR Not Detected    Narrative:      Fact sheet for providers: https://www.fda.gov/media/231317/download    Fact sheet for patients: https://www.fda.gov/media/574103/download    Test performed by PCR.        FL C Arm During Surgery    Result Date: 9/1/2021  22 seconds of fluoroscopy time provided with 1 imaged stored during right hip arthroplasty.  This report was finalized on 9/1/2021 2:38 PM by Que Yoo.      XR Chest PA & Lateral    Result Date: 8/31/2021  No acute cardiopulmonary process.  D:  08/31/2021 E:  08/31/2021  This report was finalized on 8/31/2021 6:47 PM by Dr. Jameel Recinos.      XR Hip With or Without Pelvis  2 - 3 View Right    Result Date: 9/1/2021  Postsurgical findings of interval right total hip arthroplasty without evidence of immediate hardware complication. Expected soft tissue edema and subcutaneous emphysema.  This report was finalized on 9/1/2021 11:06 AM by Que Yoo.          Current Medications:  Scheduled Meds:acetaminophen, 1,000 mg, Oral, Q8H  aspirin, 81 mg, Oral, Daily  dilTIAZem CD, 180 mg, Oral, Daily  levothyroxine, 75 mcg, Oral, Q AM  lisinopril, 40 mg, Oral, Daily  meloxicam, 15 mg, Oral, Daily  metoprolol succinate XL, 100 mg, Oral, Daily  nystatin, , Topical, Q12H  rivaroxaban, 10 mg, Oral, Daily  rosuvastatin, 10 mg, Oral, Daily  tamsulosin, 0.4 mg, Oral, Daily      Continuous Infusions:lactated ringers, 9 mL/hr, Last Rate: Stopped (09/01/21 1003)  lactated ringers, 100 mL/hr, Last Rate: 100 mL/hr (09/01/21 1507)      PRN Meds:.HYDROmorphone **AND** naloxone  •  labetalol  •  oxyCODONE  •  oxyCODONE  •  sodium chloride  •  traMADol    Assessment: Status post  AL TOTAL HIP ARTHROPLASTY [35173] (RIGHT ANTERIOR TOTAL HIP ARTHROPLASTY CEMENTED ISAC)    Patient Active Problem List   Diagnosis   • Arthritis of left hip   • Status post total replacement of left hip   • Hypertension   • Hypothyroid   • Hyperlipidemia   • Acute blood loss anemia, mild asymptomatic   • Right hip pain   • Status post total replacement of right hip   • Acute urinary retention       PLAN:   Continues current post-op course  Anticoagulation: Xarelto 10 mg for 1 week and then resume home dose  Mobilize with PT as tolerated per protocol    Weight Bearing: WBAT  Discharge Plan: OK to plan for discharge in  today to home  from orthopaedic perspective.    Vishnu Jc MD    Date: 9/2/2021    Time: 10:04 EDT

## 2021-09-02 NOTE — DISCHARGE INSTR - ACTIVITY
You may bear weight as tolerated on your right leg.     Use ice as needed for pain or swelling.    Please keep your scheduled appointment with [facility].    You have an optifoam dressing in place. This dressing is waterproof and you may shower with it in place. You may remove your dressing September 8, 2021.

## 2021-09-02 NOTE — PLAN OF CARE
Problem: Adult Inpatient Plan of Care  Goal: Plan of Care Review  Flowsheets (Taken 9/2/2021 9982)  Progress: improving  Plan of Care Reviewed With: patient  Outcome Summary: Pt ambulated 340 feet using RW and SBA. Gait limited by fatigue. Bed mobility and STS performed with SBA. Reviewed HEP and hip precautions via handout. Educated on safe car transfers. Functionally, pt safe to d/c home with assist today from a PT perspective. Recommend OPPT.   Goal Outcome Evaluation:  Plan of Care Reviewed With: patient        Progress: improving  Outcome Summary: Pt ambulated 340 feet using RW and SBA. Gait limited by fatigue. Bed mobility and STS performed with SBA. Reviewed HEP and hip precautions via handout. Educated on safe car transfers. Functionally, pt safe to d/c home with assist today from a PT perspective. Recommend OPPT.

## 2021-09-02 NOTE — CASE MANAGEMENT/SOCIAL WORK
Discharge Planning Assessment  [facility]     Patient Name: Benito Forman  MRN: [id number]  Today's Date: 9/2/2021    Admit Date: 9/1/2021    Discharge Needs Assessment     Row Name 09/02/21 1230       Living Environment    Lives With  spouse    Name(s) of Who Lives With Patient  Shanthi Garcia, p [phone]    Current Living Arrangements  home/apartment/condo    Family Caregiver if Needed  spouse    Quality of Family Relationships  unable to assess    Able to Return to Prior Arrangements  yes       Resource/Environmental Concerns    Resource/Environmental Concerns  none    Transportation Concerns  car, none       Transition Planning    Patient/Family Anticipates Transition to  home with family    Patient/Family Anticipated Services at Transition  outpatient care    Transportation Anticipated  family or friend will provide       Discharge Needs Assessment    Readmission Within the Last 30 Days  no previous admission in last 30 days    Equipment Currently Used at Home  walker, rolling;cane, straight;crutches;shower chair;grab bar    Equipment Needed After Discharge  none    Outpatient/Agency/Support Group Needs  outpatient therapy    Discharge Facility/Level of Care Needs  outpatient therapy    Provided Post Acute Provider List?  Yes    Post Acute Provider List  Outpatient Therapy    Provided Post Acute Provider Quality & Resource List?  Yes    Delivered To  Patient    Method of Delivery  In person    Patient's Choice of Community Agency(s)  [facility] in [place], [place]        Discharge Plan     Row Name 09/02/21 1232       Plan    Plan  Home with family assistance and [facility] in [place], [place]    Patient/Family in Agreement with Plan  yes    Plan Comments  Met with Mr. Forman at the bedside for discharge planning.  Mr. Forman lives with his wife, Shanthi, in a ranch home, in [place] [facility] [place].  He has been evaluated by PT and is ambulating with a rolling walker.  He denies any additional  DME needs in the home.    Discussed physical therapy and Mr. Forman will be returning to [facility].  He states that he has an appointment already scheduled tomorrow.  CM faxed updated clinical and orders to [facility].    Mr. Forman states that his wife and son can assist him at home as needed.  His son is transporting him home today at discharge.    CM will continue to follow.    Final Discharge Disposition Code  01 - home or self-care        Continued Care and Services - Admitted Since 9/1/2021    Coordination has not been started for this encounter.       Expected Discharge Date and Time     Expected Discharge Date Expected Discharge Time    Sep 2, 2021         Demographic Summary     Row Name 09/02/21 1228       General Information    Admission Type  inpatient    Arrived From  home    Reason for Consult  discharge planning    General Information Comments  PCP:  Andrew Jon       Contact Information    Permission Granted to Share Info With          Functional Status     Row Name 09/02/21 1230       Functional Status    Usual Activity Tolerance  moderate    Current Activity Tolerance  -- See PT and OT notes.       Functional Status, IADL    Medications  independent    Meal Preparation  independent    Housekeeping  independent    Laundry  independent    Shopping  independent       Mental Status    General Appearance WDL  WDL        Psychosocial    No documentation.       Abuse/Neglect    No documentation.       Legal    No documentation.       Substance Abuse    No documentation.       Patient Forms    No documentation.           Catrina Tamayo RN

## 2021-09-02 NOTE — DISCHARGE SUMMARY
Patient Name: Benito Forman  MRN: [id number]  : 1940  DOS: 2021    Attending: Vishnu Jc MD    Primary Care Provider: Andrew Jon    Date of Admission:.2021  5:46 AM    Date of Discharge:  2021    Discharge Diagnosis:     Status post total replacement of right hip    Hypertension    Hypothyroid    Hyperlipidemia    Right hip pain    Acute urinary retention      Hospital Course  At admit:  Patient is a pleasant 81 y.o. male presented for scheduled surgery by Dr. Jc.    Patient is known to me from prior hospitalization in 2020 when he had left total hip arthroplasty, he did very well postoperatively and was discharged home on postop day 1.     Per Dr. Jc's note (81 y.o. 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 Despite the risks involved the patient elected to proceed with an informed consent was obtained.).  indicated for a total hip arthroplasty. Likely risk benefits of the procedure  Patient underwent right total hip arthroplasty, anterior, under spinal anesthesia, tolerated surgery well, is being admitted for the management.     Seen in PACU, doing fairly well, no complains of nausea, vomiting, or shortness of breath.  He is drowsy but answers appropriately.     He is on chronic anticoagulation was Xarelto which is on hold for surgery.     He has history of permanent pacemaker.     After admit  Patient was provided pain medications as needed for pain control.  Adjustments were made to pain medications  to optimize postop pain management when indicated.   Risks and benefits of opiate medications discussed with patient. NELLY report was reviewed.    He was seen by PT  and has progressed well over his stay.    Patient used an IS for atelectasis prophylaxis and Xarelto along with mechanicals for DVT prophylaxis.    He had postop urinary retention requiring in and out catheterization, he was started on Flomax, he has since been able to void with postvoid residual of less than 500.    Home medications were resumed as appropriate, and labs were monitored and remained fairly stable.     With the progress he has made, kumar Forman is ready for DC home today.      Discussed with patient regarding plan and he shows understanding and agreement.    Patient will have HHPT following discharge.        Procedures Performed  Procedure(s):  ANTERIOR TOTAL HIP ARTHROPLASTY CEMENTED ISAC RIGHT       Pertinent Test Results:    I reviewed the patient's new clinical results.   Results from last 7 days   Lab Units 09/02/21  0335 08/31/21  0838   WBC 10*3/mm3  --  9.28   HEMOGLOBIN g/dL 11.5* 14.3   HEMATOCRIT % 35.1* 43.5   PLATELETS 10*3/mm3  --  295     Results from last 7 days   Lab Units 09/02/21  0335 08/31/21  0838   SODIUM mmol/L 136 144   POTASSIUM mmol/L 3.9 3.3*   CHLORIDE mmol/L 105 105   CO2 mmol/L 22.0 26.0   BUN mg/dL 19 20   CREATININE mg/dL 0.75* 0.80   CALCIUM mg/dL 8.4* 9.4   BILIRUBIN mg/dL  --  0.7   ALK PHOS U/L  --  108   ALT (SGPT) U/L  --  42*   AST (SGOT) U/L  --  36   GLUCOSE mg/dL 135* 88     I reviewed the patient's new imaging including images and reports.    Physical therapy  Progress: improving  Outcome Summary: Pt ambulated 340 feet using RW and SBA. Gait limited by fatigue. Bed mobility and STS performed with SBA. Reviewed HEP and hip precautions via handout. Educated on safe car transfers. Functionally, pt safe to d/c home with assist today from a PT perspective. Recommend OPPT.    Discharge  "Assessment:      Visit Vitals  /57   Pulse 52   Temp 98 °F (36.7 °C) (Oral)   Resp 16   Ht 182.9 cm (72\")   Wt 83.9 kg (185 lb)   SpO2 94%   BMI 25.09 kg/m²     Temp (24hrs), Av.9 °F (36.6 °C), Min:96.5 °F (35.8 °C), Max:98.6 °F (37 °C)      General Appearance:    Alert, cooperative, in no acute distress   Lungs:     Clear to auscultation,respirations regular, even and                   unlabored    Heart:    Regular rhythm and normal rate, normal S1 and S2   Abdomen:     Normal bowel sounds, no masses, no organomegaly, soft        non-tender, non-distended, no guarding, no rebound                 tenderness   Extremities:   CDI dressing over right hip incision.   Pulses:   Pulses palpable and equal bilaterally   Skin:   No bleeding, bruising or rash   Neurologic:   Cranial nerves 2 - 12 grossly intact, sensation intact, Flexion and dorsiflexion intact bilateral feet.         Discharge Disposition: Home         Discharge Medications      New Medications      Instructions Start Date   acetaminophen 500 MG tablet  Commonly known as: TYLENOL   1,000 mg, Oral, Every 8 Hours, Take every 8 hours  as needed after 1 week      docusate sodium 100 MG capsule  Commonly known as: Colace   100 mg, Oral, 2 Times Daily      nystatin [id number] UNIT/GM cream  Commonly known as: MYCOSTATIN   Topical, Every 12 Hours Scheduled      ondansetron 4 MG tablet  Commonly known as: Zofran   4 mg, Oral, Every 8 Hours PRN      oxyCODONE 5 MG immediate release tablet  Commonly known as: Roxicodone   5 mg, Oral, Every 4 Hours PRN      tamsulosin 0.4 MG capsule 24 hr capsule  Commonly known as: FLOMAX   0.4 mg, Oral, Daily      traMADol 50 MG tablet  Commonly known as: ULTRAM   50 mg, Oral, Every 6 Hours PRN         Changes to Medications      Instructions Start Date   rivaroxaban 20 MG tablet  Commonly known as: XARELTO  What changed:   · additional instructions  · These instructions start on September 10, 2021. If you are unsure what to " do until then, ask your doctor or other care provider.   20 mg, Oral, Daily, Resume after finishing 10 mg Xarelto in 1 week ( Sent by )   Start Date: September 10, 2021        Continue These Medications      Instructions Start Date   aspirin 81 MG EC tablet   81 mg, Oral, Daily, Was instructed by Dr. Jc's office to have last dose 2-29-20      dilTIAZem  MG 24 hr capsule  Commonly known as: CARDIZEM CD   180 mg, Oral, Daily      levothyroxine 75 MCG tablet  Commonly known as: SYNTHROID, LEVOTHROID   75 mcg, Oral, Daily      lisinopril 40 MG tablet  Commonly known as: PRINIVIL,ZESTRIL   40 mg, Oral, Daily      metoprolol succinate  MG 24 hr tablet  Commonly known as: TOPROL-XL   100 mg, Oral, Daily      rosuvastatin 10 MG tablet  Commonly known as: CRESTOR   10 mg, Oral, Daily             Discharge Diet: Resume prehospital diet    Activity at Discharge: Weightbearing as tolerated right lower extremity    Follow-up Appointments  Vishnu Jc MD per his orders    Dragon disclaimer:  Part of this encounter note is an electronic transcription/translation of spoken language to printed text. The electronic translation of spoken language may permit erroneous, or at times, nonsensical words or phrases to be inadvertently transcribed; Although I have reviewed the note for such errors, some may still exist.       Mikaela Brennan MD  09/02/21  09:56 EDT

## 2021-09-02 NOTE — PLAN OF CARE
Goal Outcome Evaluation:         Pt is alert and oriented X 4. VSS. Ambulates with 1 person assistance , walker and gait belt. Q2H turn. No c/o pain.

## 2021-09-02 NOTE — THERAPY TREATMENT NOTE
Patient Name: Benito Forman  : 1940    MRN: [id number]                              Today's Date: 2021       Admit Date: 2021    Visit Dx:     ICD-10-CM ICD-9-CM   1. Status post total replacement of right hip  Z96.641 V43.64   2. Right hip pain  M25.551 719.45     Patient Active Problem List   Diagnosis   • Arthritis of left hip   • Status post total replacement of left hip   • Hypertension   • Hypothyroid   • Hyperlipidemia   • Acute blood loss anemia, mild asymptomatic   • Right hip pain   • Status post total replacement of right hip   • Acute urinary retention     Past Medical History:   Diagnosis Date   • Arthritis    • Coronary artery disease     stent times 1   • Disease of thyroid gland    • Elevated cholesterol    • Hearing aid worn    • Heart attack (CMS/HCC)        • Hypertension    • Irregular heart beat    • Pacemaker    • Wears dentures    • Wears glasses      Past Surgical History:   Procedure Laterality Date   • CARDIAC CATHETERIZATION     • CATARACT EXTRACTION, BILATERAL     • CHOLECYSTECTOMY     • COLONOSCOPY     • CORONARY ANGIOPLASTY WITH STENT PLACEMENT     • ELBOW FUSION     • PACEMAKER IMPLANTATION     • POLYPECTOMY     • TOTAL HIP ARTHROPLASTY Left 3/11/2020    Procedure: TOTAL HIP ARTHROPLASTY ANTERIOR DIRECT CEMENTED LEFT;  Surgeon: Vishnu Jc MD;  Location:  [facility];  Service: Orthopedics;  Laterality: Left;   • TOTAL HIP ARTHROPLASTY Right 2021    Procedure: ANTERIOR TOTAL HIP ARTHROPLASTY CEMENTED ISAC RIGHT;  Surgeon: Vishnu Jc MD;  Location:  [facility];  Service: Orthopedics;  Laterality: Right;     General Information     Row Name 21 1300 21 0830       Physical Therapy Time and Intention    Document Type  therapy note (daily note)  -ZACHARY  therapy note (daily note)  -ZACHARY    Mode of Treatment  individual therapy;physical therapy  -ZACHARY  individual therapy;physical therapy  -ZACHARY    Row Name 21 1300 21 0830       General  Information    Existing Precautions/Restrictions  fall;right;hip, anterior  -ZACHARY  fall;right;hip, anterior  -ZCAHARY    Row Name 09/02/21 1300 09/02/21 0830       Cognition    Orientation Status (Cognition)  oriented x 4  -ZACHARY  oriented x 4  -ZACHARY    Row Name 09/02/21 1300 09/02/21 0830       Safety Issues, Functional Mobility    Safety Issues Affecting Function (Mobility)  safety precaution awareness  -  safety precaution awareness  -ZACHARY    Impairments Affecting Function (Mobility)  endurance/activity tolerance;strength;range of motion (ROM)  -ZACHARY  endurance/activity tolerance;strength;range of motion (ROM)  -ZACHARY      User Key  (r) = Recorded By, (t) = Taken By, (c) = Cosigned By    Initials Name Provider Type    ZACHARY Loki De Souza, PT Physical Therapist        Mobility     Row Name 09/02/21 1300 09/02/21 0830       Bed Mobility    Bed Mobility  --  scooting/bridging;supine-sit  -    Scooting/Bridging [place] (Bed Mobility)  --  standby assist  -    Supine-Sit [place] (Bed Mobility)  --  standby assist  -    Assistive Device (Bed Mobility)  --  bed rails;head of bed elevated  -    Comment (Bed Mobility)  Recieved [facility]  -  No cueing required for bed mobility  -    Row Name 09/02/21 1300 09/02/21 0830       Transfers    Comment (Transfers)  Verbal cues for safe hand placement during standing/sitting and moving R LE out for comfort prior  -  Verbal cues for use of hands to push off of bed to stand, reach back for chair to sit, and moving R LE out for comfort prior to sitting  -    Row Name 09/02/21 1300 09/02/21 0830       Sit-Stand Transfer    Sit-Stand [place] (Transfers)  standby assist;verbal cues  -ZACHARY  standby assist;supervision  -    Assistive Device (Sit-Stand Transfers)  walker, front-wheeled  -ZACHARY  walker, front-wheeled  -ZACHARY    Row Name 09/02/21 1300 09/02/21 0830       Gait/Stairs (Locomotion)    [place] Level (Gait)  standby assist;verbal cues  -ZACHARY  standby assist;verbal cues  -ZACHARY     Assistive Device (Gait)  walker, front-wheeled  -ZACHARY  walker, front-wheeled  -ZACHARY    Distance in Feet (Gait)  360  -ZACHARY  340  -ZACHARY    Deviations/Abnormal Patterns (Gait)  bilateral deviations;bethany decreased;gait speed decreased;stride length decreased  -ZACHARY  bilateral deviations;bethany decreased;gait speed decreased;stride length decreased  -ZACHARY    Bilateral Gait Deviations  forward flexed posture  -ZACHARY  forward flexed posture  -ZACHARY    Right Sided Gait Deviations  heel strike decreased;weight shift ability decreased  -ZACHARY  heel strike decreased;weight shift ability decreased  -ZACHARY    [place] Level (Stairs)  not tested  -ZACHARY  not tested  -ZACHARY    Comment (Gait/Stairs)  Pt ambulated with step through pattern and improved speed. Verbal cues for maintaining upright posture, increase WB through LEs, and step length. Gait limited by fatigue.  -ZACHARY  Pt ambulated with step through pattern and improved speed. Verbal cues for maintaining upright posture, body within walker, increase WB through LEs, and decrease WB through UEs. Gait limited by fatigue.  -ZACHARY    Row Name 09/02/21 1300 09/02/21 0830       Mobility    Extremity Weight-bearing Status  right lower extremity  -ZACHARY  right lower extremity  -ZACHARY    Right Lower Extremity (Weight-bearing Status)  weight-bearing as tolerated (WBAT)  -ZACHARY  weight-bearing as tolerated (WBAT)  -ZACHARY      User Key  (r) = Recorded By, (t) = Taken By, (c) = Cosigned By    Initials Name Provider Type    ZACHARY Loki De Souza, PT Physical Therapist        Obj/Interventions     Row Name 09/02/21 1300 09/02/21 0830       Motor Skills    Therapeutic Exercise  hip;knee;ankle  -ZACHARY  hip;knee;ankle  -ZACHARY    Row Name 09/02/21 1300 09/02/21 0830       Hip (Therapeutic Exercise)    Hip (Therapeutic Exercise)  isometric exercises;strengthening exercise  -ZACHARY  isometric exercises;strengthening exercise  -ZACHARY    Hip Isometrics (Therapeutic Exercise)  gluteal sets;10 repetitions  -ZACHARY  gluteal sets;10 repetitions  -ZACHARY    Hip  Strengthening (Therapeutic Exercise)  right;aBduction;aDduction;10 repetitions  -ZACHARY  right;aBduction;aDduction;10 repetitions  -ZAHCARY    Row Name 09/02/21 1300 09/02/21 0830       Knee (Therapeutic Exercise)    Knee (Therapeutic Exercise)  isometric exercises;strengthening exercise  -ZACHARY  isometric exercises;strengthening exercise  -ZACHARY    Knee Isometrics (Therapeutic Exercise)  quad sets;10 repetitions  -ZACHARY  quad sets;10 repetitions  -ZACHARY    Knee Strengthening (Therapeutic Exercise)  right;heel slides;LAQ (long arc quad);10 repetitions  -ZACHARY  right;heel slides;LAQ (long arc quad);10 repetitions  -ZACHARY    Row Name 09/02/21 1300 09/02/21 0830       Ankle (Therapeutic Exercise)    Ankle (Therapeutic Exercise)  AROM (active range of motion)  -ZACHARY  AROM (active range of motion)  -ZACHARY    Ankle AROM (Therapeutic Exercise)  bilateral;dorsiflexion;plantarflexion;10 repetitions  -ZACHARY  bilateral;dorsiflexion;plantarflexion;10 repetitions  -ZACHARY      User Key  (r) = Recorded By, (t) = Taken By, (c) = Cosigned By    Initials Name Provider Type    ZACHARY Loki De Souza, PT Physical Therapist        Goals/Plan     Row Name 09/02/21 0830          Bed Mobility Goal 1 (PT)    Activity/Assistive Device (Bed Mobility Goal 1, PT)  sit to supine/supine to sit  -ZACHARY     [place] Level/Cues Needed (Bed Mobility Goal 1, PT)  modified independence  -ZACHARY     Time Frame (Bed Mobility Goal 1, PT)  long term goal (LTG);3 days  -ZACHARY     Progress/Outcomes (Bed Mobility Goal 1, PT)  goal not met  -     Row Name 09/02/21 0830          Transfer Goal 1 (PT)    Activity/Assistive Device (Transfer Goal 1, PT)  sit-to-stand/stand-to-sit;walker, rolling  -ZACHARY     [place] Level/Cues Needed (Transfer Goal 1, PT)  modified independence  -ZACHARY     Time Frame (Transfer Goal 1, PT)  long term goal (LTG);3 days  -ZACHARY     Progress/Outcome (Transfer Goal 1, PT)  goal not met  -     Row Name 09/02/21 0830          Gait Training Goal 1 (PT)    Activity/Assistive Device (Gait  Training Goal 1, PT)  gait (walking locomotion);walker, rolling  -ZACHARY     [place] Level (Gait Training Goal 1, PT)  modified independence  -ZACHARY     Distance (Gait Training Goal 1, PT)  300 feet  -ZACHARY     Time Frame (Gait Training Goal 1, PT)  long term goal (LTG);3 days  -ZACHARY     Progress/Outcome (Gait Training Goal 1, PT)  goal partially met  -ZACHARY       User Key  (r) = Recorded By, (t) = Taken By, (c) = Cosigned By    Initials Name Provider Type    ZACHARY Loki De Souza, PT Physical Therapist        Clinical Impression     Row Name 09/02/21 1300 09/02/21 0830       Pain    Additional Documentation  Pain Scale: Numbers Pre/Post-Treatment (Group)  -ZACHARY  Pain Scale: Numbers Pre/Post-Treatment (Group)  -ZACHARY    [facility] Name 09/02/21 1300 09/02/21 0830       Pain Scale: Numbers Pre/Post-Treatment    Pretreatment Pain Rating  2/10  -ZACHARY  0/10 - no pain  -ZACHARY    Posttreatment Pain Rating  3/10  -ZACHARY  0/10 - no pain  -ZACHARY    Pain Location - Side  Right  -ZACHARY  --    Pain Location - Orientation  generalized  -ZACHARY  --    Pain Location  hip  -ZACHARY  --    Pain Intervention(s)  Ambulation/increased activity;Repositioned;Cold applied  -ZACHARY  --    [facility] Name 09/02/21 1300 09/02/21 0830       Therapy Assessment/Plan (PT)    Patient/Family Therapy Goals Statement (PT)  To return home  -ZACHARY  To return home  -ZACHARY    Rehab Potential (PT)  good, to achieve stated therapy goals  -ZACHARY  good, to achieve stated therapy goals  -ZACHARY    Criteria for Skilled Interventions Met (PT)  yes;meets criteria;skilled treatment is necessary  -ZACHARY  yes;meets criteria;skilled treatment is necessary  -ZACHARY    Row Name 09/02/21 1300 09/02/21 0830       Positioning and Restraints    Pre-Treatment Position  sitting in chair/recliner  -ZACHARY  in bed  -ZACHARY    Post Treatment Position  chair  -ZACHARY  chair  -ZACHARY    In Chair  notified nsg;reclined;call light within reach;encouraged to call for assist;exit alarm on;legs elevated;compression device  -ZACHARY  notified nsg;reclined;call light within  reach;encouraged to call for assist;exit alarm on;legs elevated;compression device  -ZACHARY      User Key  (r) = Recorded By, (t) = Taken By, (c) = Cosigned By    Initials Name Provider Type    Loki Concepcion PT Physical Therapist        Outcome Measures     Row Name 09/02/21 1300 09/02/21 0835       How much help from another person do you currently need...    Turning from your back to your side while in flat bed without using bedrails?  4  -ZACHARY  4  -KW    Moving from lying on back to sitting on the side of a flat bed without bedrails?  4  -ZACHARY  4  -KW    Moving to and from a bed to a chair (including a wheelchair)?  3  -ZACHARY  3  -KW    Standing up from a chair using your arms (e.g., wheelchair, bedside chair)?  4  -ZACHARY  4  -KW    Climbing 3-5 steps with a railing?  3  -ZACHARY  3  -KW    To walk in hospital room?  3  -ZACHARY  3  -KW    AM-PAC 6 Clicks Score (PT)  21  -ZACHARY  21  -KW    Row Name 09/02/21 0830          How much help from another person do you currently need...    Turning from your back to your side while in flat bed without using bedrails?  4  -ZACHARY     Moving from lying on back to sitting on the side of a flat bed without bedrails?  4  -ZACHARY     Moving to and from a bed to a chair (including a wheelchair)?  4  -ZACHARY     Standing up from a chair using your arms (e.g., wheelchair, bedside chair)?  4  -ZACHARY     Climbing 3-5 steps with a railing?  3  -ZACHARY     To walk in hospital room?  3  -ZACHARY     AM-PAC 6 Clicks Score (PT)  22  -ZACHARY     Row Name 09/02/21 1300 09/02/21 0830       Functional Assessment    Outcome Measure Options  AM-PAC 6 Clicks Basic Mobility (PT)  -ZACHARY  AM-PAC 6 Clicks Basic Mobility (PT)  -ZACHARY      User Key  (r) = Recorded By, (t) = Taken By, (c) = Cosigned By    Initials Name Provider Type    Maryellen Gustafson, RN Registered Nurse    Loki Concepcion, PT Physical Therapist                       Physical Therapy Education                 Title: PT OT SLP Therapies (In Progress)     Topic: Physical Therapy (Done)      Point: Mobility training (Done)     Learning Progress Summary           Patient Acceptance, E,D, VU by ZACHARY at 9/2/2021 1300    Comment: Educated on safe sequencing with bed mobility, ambulatory transfers, and gait. Reviewed HEP and hip precautions.    Acceptance, D,E,H, VU by ZACHARY at 9/2/2021 0830    Comment: Educated on safe sequencing with bed mobility, ambulatory/car transfers, and gait. Reviewed HEP and hip precautions via handout.    Acceptance, E,D, VU by ZACHARY at 9/1/2021 1515    Comment: Educated on safe sequencing with bed mobility, ambulatory transfers, and gait. Reviewed HEP and hip precautions.                   Point: Home exercise program (Done)     Learning Progress Summary           Patient Acceptance, E,D, VU by ZACHARY at 9/2/2021 1300    Comment: Educated on safe sequencing with bed mobility, ambulatory transfers, and gait. Reviewed HEP and hip precautions.    Acceptance, D,E,H, VU by ZACHARY at 9/2/2021 0830    Comment: Educated on safe sequencing with bed mobility, ambulatory/car transfers, and gait. Reviewed HEP and hip precautions via handout.    Acceptance, E,D, VU by ZACHARY at 9/1/2021 1515    Comment: Educated on safe sequencing with bed mobility, ambulatory transfers, and gait. Reviewed HEP and hip precautions.                   Point: Body mechanics (Done)     Learning Progress Summary           Patient Acceptance, E,D, VU by ZACHARY at 9/2/2021 1300    Comment: Educated on safe sequencing with bed mobility, ambulatory transfers, and gait. Reviewed HEP and hip precautions.    Acceptance, D,E,H, VU by ZACHARY at 9/2/2021 0830    Comment: Educated on safe sequencing with bed mobility, ambulatory/car transfers, and gait. Reviewed HEP and hip precautions via handout.    Acceptance, E,D, VU by ZACHARY at 9/1/2021 1515    Comment: Educated on safe sequencing with bed mobility, ambulatory transfers, and gait. Reviewed HEP and hip precautions.                   Point: Precautions (Done)     Learning Progress Summary            Patient Acceptance, E,D, VU by ZACHARY at 9/2/2021 1300    Comment: Educated on safe sequencing with bed mobility, ambulatory transfers, and gait. Reviewed HEP and hip precautions.    Acceptance, D,E,H, VU by ZACHARY at 9/2/2021 0830    Comment: Educated on safe sequencing with bed mobility, ambulatory/car transfers, and gait. Reviewed HEP and hip precautions via handout.    Acceptance, E,D, VU by ZACHARY at 9/1/2021 1515    Comment: Educated on safe sequencing with bed mobility, ambulatory transfers, and gait. Reviewed HEP and hip precautions.                               User Key     Initials Effective Dates Name Provider Type Discipline     06/16/21 -  Loki De Souza, PT Physical Therapist PT              PT Recommendation and Plan  Planned Therapy Interventions (PT): balance training, bed mobility training, gait training, home exercise program, patient/family education, transfer training, strengthening  Plan of Care Reviewed With: patient  Progress: improving  Outcome Summary: Pt increased ambulation distance to 360 feet using RW and SBA. Gait limited by fatigue STS performed with SBA. Reviewed HEP and hip precautions. Educated on safe car transfers. Safe to d/c home with assist today from a PT perspective. Recommend OPPT.     Time Calculation:   PT Charges     Row Name 09/02/21 1300 09/02/21 0830          Time Calculation    Start Time  1300  -ZACHARY  0830  -ZACHARY     PT Received On  09/02/21  -ZACHARY  09/02/21  -     PT Goal Re-Cert Due Date  09/11/21  -ZACHARY  09/11/21  -ZACHARY        Time Calculation- PT    Total Timed Code Minutes- PT  24 minute(s)  -ZACHARY  23 minute(s)  -ZACHARY        Timed Charges    08972 - PT Therapeutic Exercise Minutes  11  -ZACHARY  10  -ZACHARY     34986 - Gait Training Minutes   13  -ZACHARY  13  -ZACHARY        Total Minutes    Timed Charges Total Minutes  24  -ZACHARY  23  -ZACHARY      Total Minutes  24  -ZACHARY  23  -ZACHARY       User Key  (r) = Recorded By, (t) = Taken By, (c) = Cosigned By    Initials Name Provider Type    ZACHARY Loki De Souza, PT Physical  Therapist        Therapy Charges for Today     Code Description Service Date Service Provider Modifiers Qty    01807433729 HC GAIT TRAINING EA 15 MIN 9/1/2021 Loki De Souza, PT GP 1    07024268203 HC PT EVAL LOW COMPLEXITY 3 9/1/2021 Loki De Souza, PT GP 1    68699349020 HC PT THER SUPP EA 15 MIN 9/1/2021 Loki De Souza, PT GP 3    16499064657 HC PT THER PROC EA 15 MIN 9/2/2021 Loki De Souza, PT GP 1    40451127821 HC GAIT TRAINING EA 15 MIN 9/2/2021 Loki De Souza, PT GP 1    57180216627 HC PT THER PROC EA 15 MIN 9/2/2021 Loki De Souza, PT GP 1    86416158883 HC GAIT TRAINING EA 15 MIN 9/2/2021 Loki De Souza, PT GP 1          PT G-Codes  Outcome Measure Options: AM-PAC 6 Clicks Basic Mobility (PT)  AM-PAC 6 Clicks Score (PT): 21    Loki De Souza, PT  9/2/2021

## 2021-09-02 NOTE — THERAPY TREATMENT NOTE
Patient Name: Benito Forman  : 1940    MRN: [id number]                              Today's Date: 2021       Admit Date: 2021    Visit Dx:     ICD-10-CM ICD-9-CM   1. Right hip pain  M25.551 719.45     Patient Active Problem List   Diagnosis   • Arthritis of left hip   • Status post total replacement of left hip   • Hypertension   • Hypothyroid   • Hyperlipidemia   • Acute blood loss anemia, mild asymptomatic   • Right hip pain   • Status post total replacement of right hip     Past Medical History:   Diagnosis Date   • Arthritis    • Coronary artery disease     stent times 1   • Disease of thyroid gland    • Elevated cholesterol    • Hearing aid worn    • Heart attack (CMS/HCC)        • Hypertension    • Irregular heart beat    • Pacemaker    • Wears dentures    • Wears glasses      Past Surgical History:   Procedure Laterality Date   • CARDIAC CATHETERIZATION     • CATARACT EXTRACTION, BILATERAL     • CHOLECYSTECTOMY     • COLONOSCOPY     • CORONARY ANGIOPLASTY WITH STENT PLACEMENT     • ELBOW FUSION     • PACEMAKER IMPLANTATION     • POLYPECTOMY     • TOTAL HIP ARTHROPLASTY Left 3/11/2020    Procedure: TOTAL HIP ARTHROPLASTY ANTERIOR DIRECT CEMENTED LEFT;  Surgeon: Vishnu Jc MD;  Location: [facility] [place];  Service: Orthopedics;  Laterality: Left;     General Information     Row Name 21          Physical Therapy Time and Intention    Document Type  therapy note (daily note)  -ZACHARY     Mode of Treatment  individual therapy;physical therapy  -ZACHARY     Row Name 21          General Information    Existing Precautions/Restrictions  fall;right;hip, anterior  -ZACHARY     Row Name 21          Cognition    Orientation Status (Cognition)  oriented x 4  -ZACHARY     Row Name 21          Safety Issues, Functional Mobility    Safety Issues Affecting Function (Mobility)  safety precaution awareness  -ZACHARY     Impairments Affecting Function (Mobility)  endurance/activity  tolerance;strength;range of motion (ROM)  -       User Key  (r) = Recorded By, (t) = Taken By, (c) = Cosigned By    Initials Name Provider Type    ZACHARY Loki De Souza, PT Physical Therapist        Mobility     Row Name 09/02/21 0830          Bed Mobility    Bed Mobility  scooting/bridging;supine-sit  -ZACHARY     Scooting/Bridging [place] (Bed Mobility)  standby assist  -     Supine-Sit [place] (Bed Mobility)  standby assist  -     Assistive Device (Bed Mobility)  bed rails;head of bed elevated  -     Comment (Bed Mobility)  No cueing required for bed mobility  -     Row Name 09/02/21 0830          Transfers    Comment (Transfers)  Verbal cues for use of hands to push off of bed to stand, reach back for chair to sit, and moving R LE out for comfort prior to sitting  -     Row Name 09/02/21 0830          Sit-Stand Transfer    Sit-Stand [place] (Transfers)  standby assist;supervision  -     Assistive Device (Sit-Stand Transfers)  walker, front-wheeled  -     Row Name 09/02/21 0830          Gait/Stairs (Locomotion)    [place] Level (Gait)  standby assist;verbal cues  -     Assistive Device (Gait)  walker, front-wheeled  -     Distance in Feet (Gait)  340  -     Deviations/Abnormal Patterns (Gait)  bilateral deviations;bethany decreased;gait speed decreased;stride length decreased  -     Bilateral Gait Deviations  forward flexed posture  -     Right Sided Gait Deviations  heel strike decreased;weight shift ability decreased  -     [place] Level (Stairs)  not tested  -     Comment (Gait/Stairs)  Pt ambulated with step through pattern and improved speed. Verbal cues for maintaining upright posture, body within walker, increase WB through LEs, and decrease WB through UEs. Gait limited by fatigue.  -     Row Name 09/02/21 0830          Mobility    Extremity Weight-bearing Status  right lower extremity  -     Right Lower Extremity (Weight-bearing Status)  weight-bearing as  tolerated (WBAT)  -       User Key  (r) = Recorded By, (t) = Taken By, (c) = Cosigned By    Initials Name Provider Type    Loki Concepcion PT Physical Therapist        Obj/Interventions     Row Name 09/02/21 0830          Motor Skills    Therapeutic Exercise  hip;knee;ankle  -     Row Name 09/02/21 0830          Hip (Therapeutic Exercise)    Hip (Therapeutic Exercise)  isometric exercises;strengthening exercise  -     Hip Isometrics (Therapeutic Exercise)  gluteal sets;10 repetitions  -     Hip Strengthening (Therapeutic Exercise)  right;aBduction;aDduction;10 repetitions  -     Row Name 09/02/21 0830          Knee (Therapeutic Exercise)    Knee (Therapeutic Exercise)  isometric exercises;strengthening exercise  -     Knee Isometrics (Therapeutic Exercise)  quad sets;10 repetitions  -     Knee Strengthening (Therapeutic Exercise)  right;heel slides;LAQ (long arc quad);10 repetitions  -     Row Name 09/02/21 0830          Ankle (Therapeutic Exercise)    Ankle (Therapeutic Exercise)  AROM (active range of motion)  -     Ankle AROM (Therapeutic Exercise)  bilateral;dorsiflexion;plantarflexion;10 repetitions  -       User Key  (r) = Recorded By, (t) = Taken By, (c) = Cosigned By    Initials Name Provider Type    Loki Concepcion, LUZMA Physical Therapist        Goals/Plan     Row Name 09/02/21 0830          Bed Mobility Goal 1 (PT)    Activity/Assistive Device (Bed Mobility Goal 1, PT)  sit to supine/supine to sit  -     [place] Level/Cues Needed (Bed Mobility Goal 1, PT)  modified independence  -ZACHARY     Time Frame (Bed Mobility Goal 1, PT)  long term goal (LTG);3 days  -     Progress/Outcomes (Bed Mobility Goal 1, PT)  goal not met  -     Row Name 09/02/21 0830          Transfer Goal 1 (PT)    Activity/Assistive Device (Transfer Goal 1, PT)  sit-to-stand/stand-to-sit;walker, rolling  -ZACHARY     [place] Level/Cues Needed (Transfer Goal 1, PT)  modified independence  -ZACHARY     Time Frame (Transfer  Goal 1, PT)  long term goal (LTG);3 days  -ZACHARY     Progress/Outcome (Transfer Goal 1, PT)  goal not met  -     Row Name 09/02/21 0830          Gait Training Goal 1 (PT)    Activity/Assistive Device (Gait Training Goal 1, PT)  gait (walking locomotion);walker, rolling  -ZACHARY     [place] Level (Gait Training Goal 1, PT)  modified independence  -ZACHARY     Distance (Gait Training Goal 1, PT)  300 feet  -ZACHARY     Time Frame (Gait Training Goal 1, PT)  long term goal (LTG);3 days  -ZACHARY     Progress/Outcome (Gait Training Goal 1, PT)  goal partially met  -ZACHARY       User Key  (r) = Recorded By, (t) = Taken By, (c) = Cosigned By    Initials Name Provider Type    Loki Concepcion, PT Physical Therapist        Clinical Impression     Row Name 09/02/21 0830          Pain    Additional Documentation  Pain Scale: Numbers Pre/Post-Treatment (Group)  -     Row Name 09/02/21 0830          Pain Scale: Numbers Pre/Post-Treatment    Pretreatment Pain Rating  0/10 - no pain  -ZACHARY     Posttreatment Pain Rating  0/10 - no pain  -     Row Name 09/02/21 0830          Therapy Assessment/Plan (PT)    Patient/Family Therapy Goals Statement (PT)  To return home  -     Rehab Potential (PT)  good, to achieve stated therapy goals  -     Criteria for Skilled Interventions Met (PT)  yes;meets criteria;skilled treatment is necessary  -     Row Name 09/02/21 0830          Positioning and Restraints    Pre-Treatment Position  in bed  -     Post Treatment Position  chair  -ZACHARY     In Chair  notified nsg;reclined;call light within reach;encouraged to call for assist;exit alarm on;legs elevated;compression device  -       User Key  (r) = Recorded By, (t) = Taken By, (c) = Cosigned By    Initials Name Provider Type    Loki Concepcion, PT Physical Therapist        Outcome Measures     Row Name 09/02/21 0830          How much help from another person do you currently need...    Turning from your back to your side while in flat bed without using bedrails?   4  -ZACHARY     Moving from lying on back to sitting on the side of a flat bed without bedrails?  4  -ZACHARY     Moving to and from a bed to a chair (including a wheelchair)?  4  -ZACHARY     Standing up from a chair using your arms (e.g., wheelchair, bedside chair)?  4  -ZACHARY     Climbing 3-5 steps with a railing?  3  -ZACHARY     To walk in hospital room?  3  -ZACHARY     AM-PAC 6 Clicks Score (PT)  22  -ZACHARY     Row Name 09/02/21 0830          Functional Assessment    Outcome Measure Options  AM-PAC 6 Clicks Basic Mobility (PT)  -ZACHARY       User Key  (r) = Recorded By, (t) = Taken By, (c) = Cosigned By    Initials Name Provider Type    Loki Concepcion, PT Physical Therapist                       Physical Therapy Education                 Title: PT OT SLP Therapies (In Progress)     Topic: Physical Therapy (Done)     Point: Mobility training (Done)     Learning Progress Summary           Patient Acceptance, D,E,H, VU by ZACHARY at 9/2/2021 0830    Comment: Educated on safe sequencing with bed mobility, ambulatory/car transfers, and gait. Reviewed HEP and hip precautions via handout.    Acceptance, E,D, VU by ZACHARY at 9/1/2021 1515    Comment: Educated on safe sequencing with bed mobility, ambulatory transfers, and gait. Reviewed HEP and hip precautions.                   Point: Home exercise program (Done)     Learning Progress Summary           Patient Acceptance, D,E,H, VU by ZACHARY at 9/2/2021 0830    Comment: Educated on safe sequencing with bed mobility, ambulatory/car transfers, and gait. Reviewed HEP and hip precautions via handout.    Acceptance, E,D, VU by ZACHARY at 9/1/2021 1515    Comment: Educated on safe sequencing with bed mobility, ambulatory transfers, and gait. Reviewed HEP and hip precautions.                   Point: Body mechanics (Done)     Learning Progress Summary           Patient Acceptance, D,E,H, VU by ZACHARY at 9/2/2021 0830    Comment: Educated on safe sequencing with bed mobility, ambulatory/car transfers, and gait. Reviewed HEP and  hip precautions via handout.    Acceptance, E,D, VU by ZACHARY at 9/1/2021 1515    Comment: Educated on safe sequencing with bed mobility, ambulatory transfers, and gait. Reviewed HEP and hip precautions.                   Point: Precautions (Done)     Learning Progress Summary           Patient Acceptance, D,E,H, VU by ZACHARY at 9/2/2021 0830    Comment: Educated on safe sequencing with bed mobility, ambulatory/car transfers, and gait. Reviewed HEP and hip precautions via handout.    Acceptance, E,D, VU by ZACHARY at 9/1/2021 1515    Comment: Educated on safe sequencing with bed mobility, ambulatory transfers, and gait. Reviewed HEP and hip precautions.                               User Key     Initials Effective Dates Name Provider Type Discipline     06/16/21 -  Loki De Souza, PT Physical Therapist PT              PT Recommendation and Plan  Planned Therapy Interventions (PT): balance training, bed mobility training, gait training, home exercise program, patient/family education, transfer training, strengthening  Plan of Care Reviewed With: patient  Progress: improving  Outcome Summary: Pt ambulated 340 feet using RW and SBA. Gait limited by fatigue. Bed mobility and STS performed with SBA. Reviewed HEP and hip precautions via handout. Educated on safe car transfers. Functionally, pt safe to d/c home with assist today from a PT perspective. Recommend OPPT.     Time Calculation:   PT Charges     Row Name 09/02/21 0830             Time Calculation    Start Time  0830  -      PT Received On  09/02/21  -      PT Goal Re-Cert Due Date  09/11/21  -         Time Calculation- PT    Total Timed Code Minutes- PT  23 minute(s)  -ZACHARY         Timed Charges    18139 - PT Therapeutic Exercise Minutes  10  -ZACHARY      69380 - Gait Training Minutes   13  -ZACHARY         Total Minutes    Timed Charges Total Minutes  23  -ZACHARY       Total Minutes  23  -ZACHARY        User Key  (r) = Recorded By, (t) = Taken By, (c) = Cosigned By    Initials Name Provider  Type    ZACHARY Loki De Souza, PT Physical Therapist        Therapy Charges for Today     Code Description Service Date Service Provider Modifiers Qty    35298772785 HC GAIT TRAINING EA 15 MIN 9/1/2021 Loki De Souza, PT GP 1    33191152833 HC PT EVAL LOW COMPLEXITY 3 9/1/2021 Loki De Souza, PT GP 1    90383622661 HC PT THER SUPP EA 15 MIN 9/1/2021 Loki De Souza, PT GP 3    52979372200 HC PT THER PROC EA 15 MIN 9/2/2021 Loki De Souza, PT GP 1    13692647424 HC GAIT TRAINING EA 15 MIN 9/2/2021 Loki De Souza, PT GP 1          PT G-Codes  Outcome Measure Options: AM-PAC 6 Clicks Basic Mobility (PT)  AM-PAC 6 Clicks Score (PT): 22    Loki De Souza, PT  9/2/2021

## 2021-09-02 NOTE — NURSING NOTE
WOC consulted for: coccyx PI    Assessment and Care provided: Patient presents with excoriation to his coccyx and medial gluteal due to friction.  All skin is blanchable and pink. Some mild venous congestion noted. Cleansed with barrier wipes.    Recommendation(s): Apply z-gaurd daily and as needed. Reposition q 2 hrs. Just needs good general skin care.    *Maintain good skin care, keep dry, turn q 2 hr, keep heels elevated and offloaded with heel boots.    *Apply z-guard to bottom and bony prominences daily and as needed with incontinence episodes.  *Follow C.A.R.E protocol if medical devices (Bipap, thorne, Ng tube, etc) are being used.     All skin interventions in place.  Head to toe assessment completed. Heels and bottom blanchable, intact and offloaded. WOC orders placed.      Thank you for consulting [facility].  Will  Continue to  follow.  Please contact with questions or if needs arise.

## 2021-09-02 NOTE — PLAN OF CARE
Problem: Adult Inpatient Plan of Care  Goal: Plan of Care Review  9/2/2021 1534 by Loki De Souza, PT  Flowsheets (Taken 9/2/2021 1300)  Progress: improving  Plan of Care Reviewed With: patient  Outcome Summary: Pt increased ambulation distance to 360 feet using RW and SBA. Gait limited by fatigue STS performed with SBA. Reviewed HEP and hip precautions. Educated on safe car transfers. Safe to d/c home with assist today from a PT perspective. Recommend OPPT.   Goal Outcome Evaluation:  Plan of Care Reviewed With: patient        Progress: improving  Outcome Summary: Pt increased ambulation distance to 360 feet using RW and SBA. Gait limited by fatigue STS performed with SBA. Reviewed HEP and hip precautions. Educated on safe car transfers. Safe to d/c home with assist today from a PT perspective. Recommend OPPT.

## 2021-09-04 LAB
BACTERIA SPEC AEROBE CULT: NORMAL
GRAM STN SPEC: NORMAL
GRAM STN SPEC: NORMAL

## 2021-09-11 LAB — BACTERIA SPEC ANAEROBE CULT: NORMAL

## 2021-10-13 LAB
FUNGUS WND CULT: NORMAL
MYCOBACTERIUM SPEC CULT: NORMAL
NIGHT BLUE STAIN TISS: NORMAL

## (undated) DEVICE — FEMORAL CANAL TIP, IRRIGATION/SUCTION

## (undated) DEVICE — GLV SURG DERMASSURE GRN LF PF 7.0

## (undated) DEVICE — DISPOSABLE ORTHOPAEDIC PROTECTOR: Brand: ALEXIS ® ORTHOPAEDIC PROTECTOR

## (undated) DEVICE — PREMIUM DRY TRAY LF: Brand: MEDLINE INDUSTRIES, INC.

## (undated) DEVICE — SOL HYDROGEN PEROX 3PCT 4OZ

## (undated) DEVICE — FEMORAL CANAL PRESSURIZER WITHOUT HUB, MEDIUM

## (undated) DEVICE — 6617 IOBAN II PATIENT ISOLATION DRAPE 5/BX,4BX/CS: Brand: STERI-DRAPE™ IOBAN™ 2

## (undated) DEVICE — PULLOVER TOGA, X-LARGE: Brand: FLYTE, SURGICOOL

## (undated) DEVICE — NDL HYPO ECLPS SFTY 22G 1 1/2IN

## (undated) DEVICE — Device

## (undated) DEVICE — SYR CONTRL LUERLOK 10CC

## (undated) DEVICE — GLV SURG PREMIERPRO ORTHO LTX PF SZ7 BRN

## (undated) DEVICE — GLV SURG PREMIERPRO MIC LTX PF SZ6.5 BRN

## (undated) DEVICE — NDL HYPO ECLPS SFTY 18G 1 1/2IN

## (undated) DEVICE — SKIN AFFIX SURG ADHESIVE 72/CS 0.55ML: Brand: MEDLINE

## (undated) DEVICE — SYR LL 3CC

## (undated) DEVICE — GLV SURG SENSICARE PI MIC PF SZ9 LF STRL

## (undated) DEVICE — SYR LUERLOK 50ML

## (undated) DEVICE — DRSNG WND STRIP OPTIFOAM AG SUPRABS A/MIC 4X8IN STRL

## (undated) DEVICE — SOL ISO/ALC 70PCT 16OZ

## (undated) DEVICE — SHEET,DRAPE,53X77,STERILE: Brand: MEDLINE

## (undated) DEVICE — MARKER,SKIN,W/RULER,DUAL,STOP: Brand: MEDLINE

## (undated) DEVICE — SUT MNCRYL PLS ANTIB UD 3/0 PS2 27IN

## (undated) DEVICE — BNDG ELAS CO-FLEX SLF ADHR 4IN5YD LF STRL

## (undated) DEVICE — GLV SURG DERMASSURE GRN LF PF SZ 6.5

## (undated) DEVICE — HANDPIECE SET WITH HIGH FLOW TIP AND SUCTION TUBE: Brand: INTERPULSE

## (undated) DEVICE — GLV SURG PREMIERPRO MIC LTX PF SZ7.5 BRN

## (undated) DEVICE — SYR CONTRL PRESS/LO FIX/M/LL W/THMB/RNG 10ML

## (undated) DEVICE — STRYKER PERFORMANCE SERIES SAGITTAL BLADE: Brand: STRYKER PERFORMANCE SERIES

## (undated) DEVICE — ADHS SKIN PREMIERPRO EXOFIN TOPICAL HI/VISC .5ML

## (undated) DEVICE — CEMENT MIXING SYSTEM WITH MIS FEMORAL BREAKAWAY NOZZLE: Brand: REVOLUTION

## (undated) DEVICE — GLV SURG SENSICARE MICRO PF LF 9 STRL

## (undated) DEVICE — BLANKT WARM LOWR/BDY 100X120CM

## (undated) DEVICE — SEAL FEM EXETER 1/2/MOON DISP

## (undated) DEVICE — PATIENT RETURN ELECTRODE, SINGLE-USE, CONTACT QUALITY MONITORING, ADULT, WITH 9FT CORD, FOR PATIENTS WEIGING OVER 33LBS. (15KG): Brand: MEGADYNE

## (undated) DEVICE — GLV SURG SENSICARE MICRO PF LF 6.5 STRL

## (undated) DEVICE — PK MAJ SHLDR SPLT 10

## (undated) DEVICE — 1010 S-DRAPE TOWEL DRAPE 10/BX: Brand: STERI-DRAPE™

## (undated) DEVICE — SCRB SURG BACTOSHIELD CHG 4PCT 4OZ

## (undated) DEVICE — PREP SOL POVIDONE/IODINE BT 4OZ

## (undated) DEVICE — SPK10295 ORTHOPEDIC FRACTURE KIT: Brand: SPK10295 ORTHOPEDIC FRACTURE KIT

## (undated) DEVICE — GLV SURG PREMIERPRO ORTHO LTX PF SZ6.5 BRN

## (undated) DEVICE — GLV SURG PREMIERPRO MIC LTX PF SZ8 BRN

## (undated) DEVICE — 3M™ STERI-STRIP™ REINFORCED ADHESIVE SKIN CLOSURES, R1547, 1/2 IN X 4 IN (12 MM X 100 MM), 6 STRIPS/ENVELOPE: Brand: 3M™ STERI-STRIP™

## (undated) DEVICE — CONTAINER,SPECIMEN,OR STERILE,4OZ: Brand: MEDLINE

## (undated) DEVICE — GLV SURG SENSICARE PI ORTHO SZ8.5 LF STRL

## (undated) DEVICE — UNDERGLV SURG BIOGEL INDICAT PI SZ8.5 BLU

## (undated) DEVICE — ISO/ALC 70PCT 16OZ

## (undated) DEVICE — GLV SURG SENSICARE PI LF PF 6.5

## (undated) DEVICE — CVR HNDL LT SURG ACCSSRY BLU STRL

## (undated) DEVICE — PULLOVER TOGA, 2X LARGE: Brand: FLYTE, SURGICOOL

## (undated) DEVICE — DRP SURG UNIV BASIC BILAMINATE 53X77IN DISP

## (undated) DEVICE — GLV SURG TRIUMPH CLASSIC PF LTX 8.5 STRL

## (undated) DEVICE — ELECTRD BLD EZ CLN STD 2.5IN

## (undated) DEVICE — GLV SURG SENSICARE PI LF PF 7.5

## (undated) DEVICE — ANTIBACTERIAL UNDYED BRAIDED (POLYGLACTIN 910), SYNTHETIC ABSORBABLE SUTURE: Brand: COATED VICRYL

## (undated) DEVICE — GLV SURG SENSICARE PI LF PF 7.0

## (undated) DEVICE — TRY EPID SFTY 18G 3.5IN 1T7680

## (undated) DEVICE — PENCL ROCKRSWCH MEGADYNE W/HOLSTR 10FT SS

## (undated) DEVICE — C-ARM DRAPE: Brand: DEROYAL

## (undated) DEVICE — GLV SURG PREMIERPRO MIC LTX PF SZ7 BRN

## (undated) DEVICE — ELECTRD BLD EZ CLN STD 6.5IN

## (undated) DEVICE — GLV SURG SENSICARE MICRO PF LF 8.5 STRL

## (undated) DEVICE — STERILE PVP: Brand: MEDLINE INDUSTRIES, INC.